# Patient Record
Sex: FEMALE | Race: WHITE | NOT HISPANIC OR LATINO | Employment: FULL TIME | URBAN - METROPOLITAN AREA
[De-identification: names, ages, dates, MRNs, and addresses within clinical notes are randomized per-mention and may not be internally consistent; named-entity substitution may affect disease eponyms.]

---

## 2023-02-09 PROBLEM — I82.409 DVT (DEEP VENOUS THROMBOSIS) (MULTI): Status: ACTIVE | Noted: 2023-02-09

## 2023-02-09 PROBLEM — J30.9 ALLERGIC RHINITIS: Status: ACTIVE | Noted: 2023-02-09

## 2023-02-09 PROBLEM — E78.5 HYPERLIPIDEMIA: Status: ACTIVE | Noted: 2023-02-09

## 2023-02-09 PROBLEM — M25.552 LEFT HIP PAIN: Status: ACTIVE | Noted: 2023-02-09

## 2023-02-09 PROBLEM — L30.9 ECZEMA: Status: ACTIVE | Noted: 2023-02-09

## 2023-02-09 PROBLEM — M16.12 PRIMARY LOCALIZED OSTEOARTHRITIS OF LEFT HIP: Status: ACTIVE | Noted: 2023-02-09

## 2023-02-09 PROBLEM — L28.2 PRURITIC RASH: Status: ACTIVE | Noted: 2023-02-09

## 2023-02-09 PROBLEM — R30.0 DYSURIA: Status: ACTIVE | Noted: 2023-02-09

## 2023-02-09 PROBLEM — K29.50 CHRONIC GASTRITIS: Status: ACTIVE | Noted: 2023-02-09

## 2023-02-09 PROBLEM — M25.571 ANKLE PAIN, RIGHT: Status: ACTIVE | Noted: 2023-02-09

## 2023-02-09 PROBLEM — F32.A DEPRESSION: Status: ACTIVE | Noted: 2023-02-09

## 2023-02-09 PROBLEM — J30.1 SEASONAL ALLERGIC RHINITIS DUE TO POLLEN: Status: ACTIVE | Noted: 2023-02-09
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
## 2023-02-09 PROBLEM — R21 FACIAL RASH: Status: ACTIVE | Noted: 2023-02-09

## 2023-02-09 PROBLEM — J45.909 ASTHMA (HHS-HCC): Status: ACTIVE | Noted: 2023-02-09

## 2023-02-09 PROBLEM — I26.99 PULMONARY EMBOLISM (MULTI): Status: ACTIVE | Noted: 2023-02-09

## 2023-02-09 PROBLEM — M51.36 DEGENERATIVE DISC DISEASE, LUMBAR: Status: ACTIVE | Noted: 2023-02-09

## 2023-02-09 PROBLEM — K20.90 ESOPHAGITIS: Status: ACTIVE | Noted: 2023-02-09

## 2023-02-09 PROBLEM — D68.51 HETEROZYGOUS FACTOR V LEIDEN MUTATION (MULTI): Status: ACTIVE | Noted: 2023-02-09

## 2023-02-09 PROBLEM — R35.0 URINARY FREQUENCY: Status: ACTIVE | Noted: 2023-02-09

## 2023-02-09 PROBLEM — M51.369 DEGENERATIVE DISC DISEASE, LUMBAR: Status: ACTIVE | Noted: 2023-02-09

## 2023-02-09 PROBLEM — L40.9 PSORIASIS: Status: ACTIVE | Noted: 2023-02-09

## 2023-02-09 PROBLEM — K44.9 HIATAL HERNIA: Status: ACTIVE | Noted: 2023-02-09

## 2023-02-09 PROBLEM — R21 RASH: Status: ACTIVE | Noted: 2023-02-09

## 2023-02-09 PROBLEM — M54.10 RADICULAR LEG PAIN: Status: ACTIVE | Noted: 2023-02-09

## 2023-02-09 RX ORDER — TRIAMCINOLONE ACETONIDE 0.25 MG/G
CREAM TOPICAL
COMMUNITY
Start: 2022-02-10

## 2023-02-09 RX ORDER — OMEPRAZOLE 20 MG/1
1 TABLET, DELAYED RELEASE ORAL DAILY
COMMUNITY
Start: 2022-02-10

## 2023-02-09 RX ORDER — VENLAFAXINE HYDROCHLORIDE 150 MG/1
1 CAPSULE, EXTENDED RELEASE ORAL DAILY
COMMUNITY
Start: 2014-03-29 | End: 2023-04-17

## 2023-02-09 RX ORDER — OXYBUTYNIN CHLORIDE 15 MG/1
1 TABLET, EXTENDED RELEASE ORAL DAILY
COMMUNITY
Start: 2014-08-02 | End: 2023-03-27

## 2023-02-09 RX ORDER — FLUTICASONE PROPIONATE 50 MCG
2 SPRAY, SUSPENSION (ML) NASAL DAILY
COMMUNITY
Start: 2018-08-15 | End: 2023-06-15 | Stop reason: SDUPTHER

## 2023-02-09 RX ORDER — NYSTATIN 100000 U/G
CREAM TOPICAL
COMMUNITY
Start: 2022-02-10 | End: 2024-01-23 | Stop reason: SDUPTHER

## 2023-03-22 ENCOUNTER — APPOINTMENT (OUTPATIENT)
Dept: PRIMARY CARE | Facility: CLINIC | Age: 63
End: 2023-03-22
Payer: MEDICAID

## 2023-03-26 DIAGNOSIS — R35.0 URINARY FREQUENCY: Primary | ICD-10-CM

## 2023-03-27 RX ORDER — OXYBUTYNIN CHLORIDE 15 MG/1
TABLET, EXTENDED RELEASE ORAL
Qty: 90 TABLET | Refills: 0 | Status: SHIPPED | OUTPATIENT
Start: 2023-03-27 | End: 2023-06-26

## 2023-04-17 DIAGNOSIS — F32.A DEPRESSION, UNSPECIFIED DEPRESSION TYPE: Primary | ICD-10-CM

## 2023-04-17 RX ORDER — VENLAFAXINE HYDROCHLORIDE 150 MG/1
CAPSULE, EXTENDED RELEASE ORAL
Qty: 90 CAPSULE | Refills: 0 | Status: SHIPPED | OUTPATIENT
Start: 2023-04-17 | End: 2023-07-24

## 2023-06-15 ENCOUNTER — OFFICE VISIT (OUTPATIENT)
Dept: PRIMARY CARE | Facility: CLINIC | Age: 63
End: 2023-06-15
Payer: MEDICAID

## 2023-06-15 VITALS
OXYGEN SATURATION: 95 % | WEIGHT: 195.2 LBS | SYSTOLIC BLOOD PRESSURE: 112 MMHG | DIASTOLIC BLOOD PRESSURE: 60 MMHG | HEART RATE: 73 BPM | BODY MASS INDEX: 33.32 KG/M2 | RESPIRATION RATE: 18 BRPM | HEIGHT: 64 IN

## 2023-06-15 DIAGNOSIS — Z12.11 COLON CANCER SCREENING: ICD-10-CM

## 2023-06-15 DIAGNOSIS — F32.A DEPRESSION, UNSPECIFIED DEPRESSION TYPE: ICD-10-CM

## 2023-06-15 DIAGNOSIS — D68.51 HETEROZYGOUS FACTOR V LEIDEN MUTATION (MULTI): ICD-10-CM

## 2023-06-15 DIAGNOSIS — J30.9 ALLERGIC RHINITIS, UNSPECIFIED SEASONALITY, UNSPECIFIED TRIGGER: ICD-10-CM

## 2023-06-15 DIAGNOSIS — Z00.00 ANNUAL PHYSICAL EXAM: Primary | ICD-10-CM

## 2023-06-15 DIAGNOSIS — I82.5Y9 CHRONIC DEEP VEIN THROMBOSIS (DVT) OF PROXIMAL VEIN OF LOWER EXTREMITY, UNSPECIFIED LATERALITY (MULTI): ICD-10-CM

## 2023-06-15 DIAGNOSIS — J45.909 ASTHMA, UNSPECIFIED ASTHMA SEVERITY, UNSPECIFIED WHETHER COMPLICATED, UNSPECIFIED WHETHER PERSISTENT (HHS-HCC): ICD-10-CM

## 2023-06-15 DIAGNOSIS — Z23 IMMUNIZATION DUE: ICD-10-CM

## 2023-06-15 DIAGNOSIS — Z12.39 BREAST SCREENING: ICD-10-CM

## 2023-06-15 PROBLEM — R35.0 URINARY FREQUENCY: Status: RESOLVED | Noted: 2023-02-09 | Resolved: 2023-06-15

## 2023-06-15 PROCEDURE — 90471 IMMUNIZATION ADMIN: CPT | Performed by: STUDENT IN AN ORGANIZED HEALTH CARE EDUCATION/TRAINING PROGRAM

## 2023-06-15 PROCEDURE — 1036F TOBACCO NON-USER: CPT | Performed by: STUDENT IN AN ORGANIZED HEALTH CARE EDUCATION/TRAINING PROGRAM

## 2023-06-15 PROCEDURE — 99214 OFFICE O/P EST MOD 30 MIN: CPT | Performed by: STUDENT IN AN ORGANIZED HEALTH CARE EDUCATION/TRAINING PROGRAM

## 2023-06-15 PROCEDURE — 90715 TDAP VACCINE 7 YRS/> IM: CPT | Performed by: STUDENT IN AN ORGANIZED HEALTH CARE EDUCATION/TRAINING PROGRAM

## 2023-06-15 RX ORDER — FLUTICASONE PROPIONATE 50 MCG
2 SPRAY, SUSPENSION (ML) NASAL DAILY
Qty: 16 G | Refills: 6 | Status: SHIPPED | OUTPATIENT
Start: 2023-06-15

## 2023-06-15 ASSESSMENT — PATIENT HEALTH QUESTIONNAIRE - PHQ9
SUM OF ALL RESPONSES TO PHQ9 QUESTIONS 1 AND 2: 0
1. LITTLE INTEREST OR PLEASURE IN DOING THINGS: NOT AT ALL
2. FEELING DOWN, DEPRESSED OR HOPELESS: NOT AT ALL

## 2023-06-15 NOTE — PROGRESS NOTES
Subjective   Patient ID: Celia Shahdi is a 62 y.o. female who presents for Annual Exam (Pt is here for CPE.).  Mother did move in and has been having some difficulties with her falling  She is currently getting a bit better but has been hard    PMHx of DVT/PE post surgery , DVT- 04- ocps, Depression, Asthma (controlled), Psoriasis, DSL who presents for depression follow up.        Objective   Physical Exam  Constitutional:       Appearance: Normal appearance.   Cardiovascular:      Rate and Rhythm: Normal rate and regular rhythm.      Heart sounds: No murmur heard.  Pulmonary:      Effort: Pulmonary effort is normal. No respiratory distress.      Breath sounds: Normal breath sounds. No wheezing.   Musculoskeletal:      Cervical back: Neck supple.      Left lower leg: No edema.   Neurological:      Mental Status: She is alert.       A/P        Annual CPE   Doing well physical exam benign   UTD on vaccines   UTD on vision and dental visits     Anxiety  controlled   refilled venlafaxine to 262.5mg daily      DVT/PE  indefinite anticoagulation  eliquis 2.5mg BID   *pt second provoked DVT with family history of DVT     Seasonal allergies   *Flonase refilled      Health Maintenance   mammogram UTD 2/22  influenza vaccine   Pap due 2006 negative/negative      6 month follow up for HTN

## 2023-06-22 ENCOUNTER — LAB (OUTPATIENT)
Dept: LAB | Facility: LAB | Age: 63
End: 2023-06-22
Payer: MEDICAID

## 2023-06-22 DIAGNOSIS — J30.9 ALLERGIC RHINITIS, UNSPECIFIED SEASONALITY, UNSPECIFIED TRIGGER: ICD-10-CM

## 2023-06-22 DIAGNOSIS — F32.A DEPRESSION, UNSPECIFIED DEPRESSION TYPE: ICD-10-CM

## 2023-06-22 LAB
ALANINE AMINOTRANSFERASE (SGPT) (U/L) IN SER/PLAS: 14 U/L (ref 7–45)
ALBUMIN (G/DL) IN SER/PLAS: 4.1 G/DL (ref 3.4–5)
ALKALINE PHOSPHATASE (U/L) IN SER/PLAS: 85 U/L (ref 33–136)
ANION GAP IN SER/PLAS: 12 MMOL/L (ref 10–20)
ASPARTATE AMINOTRANSFERASE (SGOT) (U/L) IN SER/PLAS: 16 U/L (ref 9–39)
BASOPHILS (10*3/UL) IN BLOOD BY AUTOMATED COUNT: 0.08 X10E9/L (ref 0–0.1)
BASOPHILS/100 LEUKOCYTES IN BLOOD BY AUTOMATED COUNT: 1.3 % (ref 0–2)
BILIRUBIN TOTAL (MG/DL) IN SER/PLAS: 0.4 MG/DL (ref 0–1.2)
CALCIUM (MG/DL) IN SER/PLAS: 8.5 MG/DL (ref 8.6–10.3)
CARBON DIOXIDE, TOTAL (MMOL/L) IN SER/PLAS: 26 MMOL/L (ref 21–32)
CHLORIDE (MMOL/L) IN SER/PLAS: 105 MMOL/L (ref 98–107)
CHOLESTEROL (MG/DL) IN SER/PLAS: 193 MG/DL (ref 0–199)
CHOLESTEROL IN HDL (MG/DL) IN SER/PLAS: 53.1 MG/DL
CHOLESTEROL/HDL RATIO: 3.6
CREATININE (MG/DL) IN SER/PLAS: 0.75 MG/DL (ref 0.5–1.05)
EOSINOPHILS (10*3/UL) IN BLOOD BY AUTOMATED COUNT: 0.37 X10E9/L (ref 0–0.7)
EOSINOPHILS/100 LEUKOCYTES IN BLOOD BY AUTOMATED COUNT: 6.2 % (ref 0–6)
ERYTHROCYTE DISTRIBUTION WIDTH (RATIO) BY AUTOMATED COUNT: 13.5 % (ref 11.5–14.5)
ERYTHROCYTE MEAN CORPUSCULAR HEMOGLOBIN CONCENTRATION (G/DL) BY AUTOMATED: 32.2 G/DL (ref 32–36)
ERYTHROCYTE MEAN CORPUSCULAR VOLUME (FL) BY AUTOMATED COUNT: 88 FL (ref 80–100)
ERYTHROCYTES (10*6/UL) IN BLOOD BY AUTOMATED COUNT: 4.21 X10E12/L (ref 4–5.2)
GFR FEMALE: 90 ML/MIN/1.73M2
GLUCOSE (MG/DL) IN SER/PLAS: 77 MG/DL (ref 74–99)
HEMATOCRIT (%) IN BLOOD BY AUTOMATED COUNT: 36.9 % (ref 36–46)
HEMOGLOBIN (G/DL) IN BLOOD: 11.9 G/DL (ref 12–16)
IMMATURE GRANULOCYTES/100 LEUKOCYTES IN BLOOD BY AUTOMATED COUNT: 0.3 % (ref 0–0.9)
LDL: 117 MG/DL (ref 0–99)
LEUKOCYTES (10*3/UL) IN BLOOD BY AUTOMATED COUNT: 6 X10E9/L (ref 4.4–11.3)
LYMPHOCYTES (10*3/UL) IN BLOOD BY AUTOMATED COUNT: 1.74 X10E9/L (ref 1.2–4.8)
LYMPHOCYTES/100 LEUKOCYTES IN BLOOD BY AUTOMATED COUNT: 29.1 % (ref 13–44)
MONOCYTES (10*3/UL) IN BLOOD BY AUTOMATED COUNT: 0.51 X10E9/L (ref 0.1–1)
MONOCYTES/100 LEUKOCYTES IN BLOOD BY AUTOMATED COUNT: 8.5 % (ref 2–10)
NEUTROPHILS (10*3/UL) IN BLOOD BY AUTOMATED COUNT: 3.25 X10E9/L (ref 1.2–7.7)
NEUTROPHILS/100 LEUKOCYTES IN BLOOD BY AUTOMATED COUNT: 54.6 % (ref 40–80)
PLATELETS (10*3/UL) IN BLOOD AUTOMATED COUNT: 254 X10E9/L (ref 150–450)
POTASSIUM (MMOL/L) IN SER/PLAS: 4.4 MMOL/L (ref 3.5–5.3)
PROTEIN TOTAL: 6.5 G/DL (ref 6.4–8.2)
SODIUM (MMOL/L) IN SER/PLAS: 139 MMOL/L (ref 136–145)
THYROTROPIN (MIU/L) IN SER/PLAS BY DETECTION LIMIT <= 0.05 MIU/L: 1.9 MIU/L (ref 0.44–3.98)
TRIGLYCERIDE (MG/DL) IN SER/PLAS: 116 MG/DL (ref 0–149)
UREA NITROGEN (MG/DL) IN SER/PLAS: 16 MG/DL (ref 6–23)
VLDL: 23 MG/DL (ref 0–40)

## 2023-06-22 PROCEDURE — 84443 ASSAY THYROID STIM HORMONE: CPT

## 2023-06-22 PROCEDURE — 36415 COLL VENOUS BLD VENIPUNCTURE: CPT

## 2023-06-22 PROCEDURE — 80053 COMPREHEN METABOLIC PANEL: CPT

## 2023-06-22 PROCEDURE — 85025 COMPLETE CBC W/AUTO DIFF WBC: CPT

## 2023-06-22 PROCEDURE — 80061 LIPID PANEL: CPT

## 2023-06-25 DIAGNOSIS — R35.0 URINARY FREQUENCY: ICD-10-CM

## 2023-06-26 RX ORDER — OXYBUTYNIN CHLORIDE 15 MG/1
TABLET, EXTENDED RELEASE ORAL
Qty: 90 TABLET | Refills: 0 | Status: SHIPPED | OUTPATIENT
Start: 2023-06-26 | End: 2023-09-25

## 2023-07-23 DIAGNOSIS — F32.A DEPRESSION, UNSPECIFIED DEPRESSION TYPE: ICD-10-CM

## 2023-07-24 RX ORDER — VENLAFAXINE HYDROCHLORIDE 150 MG/1
CAPSULE, EXTENDED RELEASE ORAL
Qty: 90 CAPSULE | Refills: 1 | Status: SHIPPED | OUTPATIENT
Start: 2023-07-24 | End: 2024-01-23 | Stop reason: SDUPTHER

## 2023-09-24 DIAGNOSIS — R35.0 URINARY FREQUENCY: ICD-10-CM

## 2023-09-25 RX ORDER — OXYBUTYNIN CHLORIDE 15 MG/1
TABLET, EXTENDED RELEASE ORAL
Qty: 90 TABLET | Refills: 1 | Status: SHIPPED | OUTPATIENT
Start: 2023-09-25 | End: 2024-03-23

## 2023-10-06 ENCOUNTER — LAB REQUISITION (OUTPATIENT)
Dept: LAB | Facility: HOSPITAL | Age: 63
End: 2023-10-06
Payer: MEDICAID

## 2023-10-06 DIAGNOSIS — R31.9 HEMATURIA, UNSPECIFIED: ICD-10-CM

## 2023-10-06 PROCEDURE — 87624 HPV HI-RISK TYP POOLED RSLT: CPT

## 2023-10-06 PROCEDURE — 88141 CYTOPATH C/V INTERPRET: CPT | Performed by: PATHOLOGY

## 2023-10-06 PROCEDURE — 87086 URINE CULTURE/COLONY COUNT: CPT

## 2023-10-06 PROCEDURE — 88175 CYTOPATH C/V AUTO FLUID REDO: CPT

## 2023-10-08 LAB — BACTERIA UR CULT: ABNORMAL

## 2023-10-10 ENCOUNTER — LAB REQUISITION (OUTPATIENT)
Dept: LAB | Facility: HOSPITAL | Age: 63
End: 2023-10-10
Payer: MEDICAID

## 2023-10-10 DIAGNOSIS — Z11.51 ENCOUNTER FOR SCREENING FOR HUMAN PAPILLOMAVIRUS (HPV): ICD-10-CM

## 2023-10-10 DIAGNOSIS — Z12.4 ENCOUNTER FOR SCREENING FOR MALIGNANT NEOPLASM OF CERVIX: ICD-10-CM

## 2023-10-10 DIAGNOSIS — Z01.419 ENCOUNTER FOR GYNECOLOGICAL EXAMINATION (GENERAL) (ROUTINE) WITHOUT ABNORMAL FINDINGS: ICD-10-CM

## 2023-10-30 ENCOUNTER — TELEPHONE (OUTPATIENT)
Dept: HEMATOLOGY/ONCOLOGY | Facility: CLINIC | Age: 63
End: 2023-10-30
Payer: MEDICAID

## 2023-10-30 DIAGNOSIS — D68.51 HETEROZYGOUS FACTOR V LEIDEN MUTATION (MULTI): Primary | ICD-10-CM

## 2023-10-30 DIAGNOSIS — I26.99 PULMONARY EMBOLISM, UNSPECIFIED CHRONICITY, UNSPECIFIED PULMONARY EMBOLISM TYPE, UNSPECIFIED WHETHER ACUTE COR PULMONALE PRESENT (MULTI): ICD-10-CM

## 2023-10-31 LAB
CYTOLOGY CMNT CVX/VAG CYTO-IMP: NORMAL
HPV HR GENOTYPES PNL CVX NAA+PROBE: NEGATIVE
HPV HR GENOTYPES PNL CVX NAA+PROBE: NEGATIVE
HPV16 DNA SPEC QL NAA+PROBE: NEGATIVE
HPV18 DNA SPEC QL NAA+PROBE: NEGATIVE
LAB AP HPV GENOTYPE QUESTION: YES
LAB AP HPV HR: NORMAL
LABORATORY COMMENT REPORT: NORMAL
MENSTRUAL HX REPORTED: NORMAL
PATH REPORT.TOTAL CANCER: NORMAL

## 2023-11-02 ENCOUNTER — CLINICAL SUPPORT (OUTPATIENT)
Dept: PREADMISSION TESTING | Facility: HOSPITAL | Age: 63
End: 2023-11-02
Payer: MEDICAID

## 2023-11-02 ASSESSMENT — DUKE ACTIVITY SCORE INDEX (DASI)
CAN YOU WALK A BLOCK OR TWO ON LEVEL GROUND: YES
CAN YOU DO HEAVY WORK AROUND THE HOUSE LIKE SCRUBBING FLOORS OR LIFTING AND MOVING HEAVY FURNITURE: YES
CAN YOU RUN A SHORT DISTANCE: YES
CAN YOU CLIMB A FLIGHT OF STAIRS OR WALK UP A HILL: YES
CAN YOU DO MODERATE WORK AROUND THE HOUSE LIKE VACUUMING, SWEEPING FLOORS OR CARRYING GROCERIES: YES
CAN YOU WALK INDOORS, SUCH AS AROUND YOUR HOUSE: YES
CAN YOU TAKE CARE OF YOURSELF (EAT, DRESS, BATHE, OR USE TOILET): YES
CAN YOU HAVE SEXUAL RELATIONS: YES
DASI METS SCORE: 8.2
CAN YOU DO LIGHT WORK AROUND THE HOUSE LIKE DUSTING OR WASHING DISHES: YES
TOTAL_SCORE: 44.7
CAN YOU PARTICIPATE IN MODERATE RECREATIONAL ACTIVITIES LIKE GOLF, BOWLING, DANCING, DOUBLES TENNIS OR THROWING A BASEBALL OR FOOTBALL: NO
CAN YOU PARTICIPATE IN STRENOUS SPORTS LIKE SWIMMING, SINGLES TENNIS, FOOTBALL, BASKETBALL, OR SKIING: NO
CAN YOU DO YARD WORK LIKE RAKING LEAVES, WEEDING OR PUSHING A MOWER: YES

## 2023-11-02 NOTE — PREPROCEDURE INSTRUCTIONS
Medication List            Accurate as of November 2, 2023  9:45 AM. Always use your most recent med list.                apixaban 5 mg tablet  Commonly known as: Eliquis  Eliquis 5 MG Oral Tablet 1 BID   Quantity: 60  Refills: 0        Start : 16-Mar-2021   Active  Medication Adjustments for Surgery: Stop 2 days before surgery     fluticasone 50 mcg/actuation nasal spray  Commonly known as: Flonase  Administer 2 sprays into each nostril once daily.  Medication Adjustments for Surgery: Take morning of surgery with sip of water, no other fluids     nystatin cream  Commonly known as: Mycostatin  Medication Adjustments for Surgery: Other (Comment)     omeprazole OTC 20 mg EC tablet  Commonly known as: PriLOSEC OTC  Medication Adjustments for Surgery: Take morning of surgery with sip of water, no other fluids     oxybutynin XL 15 mg 24 hr tablet  Commonly known as: Ditropan-XL  Take 1 tablet by mouth once daily  Medication Adjustments for Surgery: Continue until night before surgery     triamcinolone 0.025 % cream  Commonly known as: Kenalog  Medication Adjustments for Surgery: Continue until night before surgery     venlafaxine  mg 24 hr capsule  Commonly known as: Effexor-XR  Take 1 capsule by mouth once daily  Medication Adjustments for Surgery: Take morning of surgery with sip of water, no other fluids                              NPO Instructions:    Follow instructions. Day before procedure-may have light breakfast by 9am (ex. 1 egg, 1 piece of toast).  Then CLEAR LIQUIDS ONLY-No dairy products, nothing red/purple.  Take first part of prep- Evening Before Procedure.  Drink lots of liquids.  Day of Procedure- 3-4am Take Second Part of Prep.   STOP DRINKING 3 HOURS PRIOR TO PROCEDURE.  Take medications as instructed.      Additional Instructions:     Review your medication instructions, take indicated medications  Wear  comfortable loose fitting clothing  All jewelry and valuables should be left at  home    Park in back of hospital by ER. Come up to Second floor-Outpt dept to check in.  Bring Photo ID and Insurance card,   You MUST have a  with you.      Call Outpatient dept at [phone] the night before your procedure (Friday for Monday procedure), between 1-3 pm.   If you get ill at all before your procedure- CALL YOUR DOCTOR/SURGEON.  We want you in the best shape that is possible. Any sickness might lead to your procedure being delayed.

## 2023-11-05 ENCOUNTER — PREP FOR PROCEDURE (OUTPATIENT)
Dept: SURGERY | Facility: HOSPITAL | Age: 63
End: 2023-11-05
Payer: MEDICAID

## 2023-11-05 RX ORDER — SODIUM CHLORIDE, SODIUM LACTATE, POTASSIUM CHLORIDE, CALCIUM CHLORIDE 600; 310; 30; 20 MG/100ML; MG/100ML; MG/100ML; MG/100ML
100 INJECTION, SOLUTION INTRAVENOUS CONTINUOUS
OUTPATIENT
Start: 2023-11-05

## 2023-11-05 RX ORDER — ONDANSETRON HYDROCHLORIDE 2 MG/ML
4 INJECTION, SOLUTION INTRAVENOUS ONCE AS NEEDED
OUTPATIENT
Start: 2023-11-05

## 2023-12-12 ENCOUNTER — APPOINTMENT (OUTPATIENT)
Dept: PRIMARY CARE | Facility: CLINIC | Age: 63
End: 2023-12-12
Payer: MEDICAID

## 2023-12-13 ENCOUNTER — TELEPHONE (OUTPATIENT)
Dept: HEMATOLOGY/ONCOLOGY | Facility: CLINIC | Age: 63
End: 2023-12-13
Payer: MEDICAID

## 2023-12-13 DIAGNOSIS — I26.99 PULMONARY EMBOLISM, UNSPECIFIED CHRONICITY, UNSPECIFIED PULMONARY EMBOLISM TYPE, UNSPECIFIED WHETHER ACUTE COR PULMONALE PRESENT (MULTI): ICD-10-CM

## 2023-12-13 DIAGNOSIS — I26.99 PULMONARY EMBOLISM, UNSPECIFIED CHRONICITY, UNSPECIFIED PULMONARY EMBOLISM TYPE, UNSPECIFIED WHETHER ACUTE COR PULMONALE PRESENT (MULTI): Primary | ICD-10-CM

## 2024-01-04 ENCOUNTER — APPOINTMENT (OUTPATIENT)
Dept: HEMATOLOGY/ONCOLOGY | Facility: CLINIC | Age: 64
End: 2024-01-04
Payer: MEDICAID

## 2024-01-09 ENCOUNTER — APPOINTMENT (OUTPATIENT)
Dept: PRIMARY CARE | Facility: CLINIC | Age: 64
End: 2024-01-09
Payer: MEDICAID

## 2024-01-11 ENCOUNTER — OFFICE VISIT (OUTPATIENT)
Dept: HEMATOLOGY/ONCOLOGY | Facility: CLINIC | Age: 64
End: 2024-01-11
Payer: MEDICAID

## 2024-01-11 VITALS
RESPIRATION RATE: 18 BRPM | WEIGHT: 202.6 LBS | HEART RATE: 60 BPM | BODY MASS INDEX: 34.78 KG/M2 | OXYGEN SATURATION: 100 % | DIASTOLIC BLOOD PRESSURE: 83 MMHG | TEMPERATURE: 96.6 F | SYSTOLIC BLOOD PRESSURE: 153 MMHG

## 2024-01-11 DIAGNOSIS — Z79.01 CURRENT USE OF LONG TERM ANTICOAGULATION: Primary | ICD-10-CM

## 2024-01-11 DIAGNOSIS — I26.99 PULMONARY EMBOLISM, UNSPECIFIED CHRONICITY, UNSPECIFIED PULMONARY EMBOLISM TYPE, UNSPECIFIED WHETHER ACUTE COR PULMONALE PRESENT (MULTI): ICD-10-CM

## 2024-01-11 PROCEDURE — 99213 OFFICE O/P EST LOW 20 MIN: CPT | Performed by: NURSE PRACTITIONER

## 2024-01-11 PROCEDURE — 1036F TOBACCO NON-USER: CPT | Performed by: NURSE PRACTITIONER

## 2024-01-11 ASSESSMENT — ENCOUNTER SYMPTOMS
OCCASIONAL FEELINGS OF UNSTEADINESS: 0
LOSS OF SENSATION IN FEET: 0
DEPRESSION: 0

## 2024-01-11 ASSESSMENT — PATIENT HEALTH QUESTIONNAIRE - PHQ9
1. LITTLE INTEREST OR PLEASURE IN DOING THINGS: NOT AT ALL
2. FEELING DOWN, DEPRESSED OR HOPELESS: NOT AT ALL
SUM OF ALL RESPONSES TO PHQ9 QUESTIONS 1 AND 2: 0

## 2024-01-11 ASSESSMENT — COLUMBIA-SUICIDE SEVERITY RATING SCALE - C-SSRS
6. HAVE YOU EVER DONE ANYTHING, STARTED TO DO ANYTHING, OR PREPARED TO DO ANYTHING TO END YOUR LIFE?: NO
2. HAVE YOU ACTUALLY HAD ANY THOUGHTS OF KILLING YOURSELF?: NO

## 2024-01-11 NOTE — PROGRESS NOTES
Patient ID: Celia Shahid is a 63 y.o. female.    History of Present Illness:  63-year-old woman who has a history of DVT and PE, initially had a DVT and PE in her late 40s.  At that time, she was on birth control pills. She was treated with Coumadin for approximately one year's time. She then presented postoperativly with shortness of beath in September of 2019 and was found to have right lung PE and left lower extremity  DVT. She was treated with heparin and sent home on Eliquis, which she has continued to date, Eliquis 5mg BID.  HETEROZYGOUS for Factor V Leiden, prothrombin gene mutation negative. Plan is for life long anticoagulation.     Interval History:  Patient returns today for routine follow-up evaluation for recurrent thrombosis and factor V Leiden heterozygosity.  She remains on Eliquis and is taking it as directed, planned lifelong therapy. On presentation today she denies any fevers, chills or night sweats. No cough, chest pain or shortness of breath. No nausea or vomiting. No constipation or diarrhea. No signs or symptoms of active bleeding or unusual bruising.  No surgical interventions over the past year and no upcoming surgeries planned.  Patient is primary caregiver for her mother who is now moved in with her and is exhibiting signs of dementia.    Review of Systems:  A review of systems has been completed and are negative for complaints except what is stated in the HPI and/or past medical history      Past Medical History:  Depression, DVT and PE on life long anticoagulation    Past Surgical History:  retrocalcaneal exostectomy of the left foot (2019)  FAMILY HISTORY: Mother had blood clots.     Allergies:    Oxaprozin    Medications:  Medication list reviewed with patient and updated in EMR    Social History:   She lives with her  and extended family.  Primary caregiver for her mother.  She works at [employer]  She does not smoke nor drink.       Vital Signs:   /83 (BP Location:  Right arm, Patient Position: Sitting, BP Cuff Size: Large adult long)   Pulse 60   Temp 35.9 °C (96.6 °F) (Temporal)   Resp 18   Wt 91.9 kg (202 lb 9.6 oz)   SpO2 100%   BMI 34.78 kg/m²     Physical Exam:  ECO  Pain: 0  Constitutional: Well developed, awake/alert/oriented x3, no distress, alert and cooperative  Eyes: PER. sclera anicteric  ENMT: Oral mucosa moist  Respiratory/Thorax: Breathing is non-labored. Lungs are clear to auscultation bilaterally. No adventitious breath sounds  Cardiovascular: S1-S2. Regular rate and rhythm. No murmurs, rubs, or gallops appreciated  Gastrointestinal: Abdomen soft nontender, nondistended, normal active bowel sounds.   Musculoskeletal: ROM intact, no joint swelling, normal strength  Extremities: normal extremities, no cyanosis, no edema, no clubbing  Neurologic: alert and oriented x3. Nonfocal exam. No myoclonus  Psychological: Pleasant, appropriate and easily engaged     Lab Results:  2023  WBC 6.0, hemoglobin 1.9, hematocrit 36.9, platelets 245,000  CMP no concerning findings    Radiology Results:  2019 CT angio chest     IMPRESSION:  1. Right central pulmonary artery embolus extending into the right middle   lobe pulmonary artery measures showering emboli in the medial and lateral   segmental arteries.  2. Atelectasis in the right and left lung bases and right middle lobe.  3. Mild patchy attenuation of the lung parenchyma which be seen with   component pulmonary edema.   ___________________________________________________     2019  RESULT: DPL VENOUS LEG LT: 2019 5:06 PM  CLINICAL HISTORY: Shortness of breath and left leg cast.      FINDINGS:  Left Lower Extremity: Within the left posterior tibial veins is acute   hypoechoic thrombus with lack of compressibility and no color flow. The   left common femoral vein, femoral vein, popliteal vein and peroneal veins   are patent.     Contralateral common femoral vein was patent.      IMPRESSION:     1. Acute deep venous thrombosis in left calf veins. No acute deep venous   thrombosis above the knee.         Assessment:  This is a pleasant 63-year-old female with recurrent DVT and PE x2. She also carries a history of factor V Leiden. Current dose of Eliquis is 5 mg twice daily. We  will continue this. The patient is on lifelong anticoagulation at this time. She is up to date with regular health maintenance including colonoscopy and mammograms.   A refill was placed for her Eliquis today    Plan:  - 1 year follow-up  - confirm CBCd and CMP are completed annually          Curt Calvillo, APRN-CNP

## 2024-01-23 ENCOUNTER — LAB (OUTPATIENT)
Dept: LAB | Facility: LAB | Age: 64
End: 2024-01-23
Payer: MEDICAID

## 2024-01-23 ENCOUNTER — OFFICE VISIT (OUTPATIENT)
Dept: PRIMARY CARE | Facility: CLINIC | Age: 64
End: 2024-01-23
Payer: MEDICAID

## 2024-01-23 VITALS
BODY MASS INDEX: 34.08 KG/M2 | RESPIRATION RATE: 17 BRPM | HEIGHT: 64 IN | WEIGHT: 199.6 LBS | DIASTOLIC BLOOD PRESSURE: 70 MMHG | SYSTOLIC BLOOD PRESSURE: 116 MMHG | OXYGEN SATURATION: 99 % | HEART RATE: 78 BPM

## 2024-01-23 DIAGNOSIS — R21 RASH: ICD-10-CM

## 2024-01-23 DIAGNOSIS — F32.A DEPRESSION, UNSPECIFIED DEPRESSION TYPE: ICD-10-CM

## 2024-01-23 DIAGNOSIS — Z13.9 SCREENING DUE: Primary | ICD-10-CM

## 2024-01-23 DIAGNOSIS — E66.9 OBESITY (BMI 30.0-34.9): ICD-10-CM

## 2024-01-23 LAB
ALBUMIN SERPL BCP-MCNC: 4.1 G/DL (ref 3.4–5)
ALP SERPL-CCNC: 126 U/L (ref 33–136)
ALT SERPL W P-5'-P-CCNC: 17 U/L (ref 7–45)
ANION GAP SERPL CALC-SCNC: 12 MMOL/L (ref 10–20)
AST SERPL W P-5'-P-CCNC: 20 U/L (ref 9–39)
BASOPHILS # BLD AUTO: 0.07 X10*3/UL (ref 0–0.1)
BASOPHILS NFR BLD AUTO: 1.2 %
BILIRUB SERPL-MCNC: 0.5 MG/DL (ref 0–1.2)
BUN SERPL-MCNC: 11 MG/DL (ref 6–23)
CALCIUM SERPL-MCNC: 9.1 MG/DL (ref 8.6–10.3)
CHLORIDE SERPL-SCNC: 104 MMOL/L (ref 98–107)
CHOLEST SERPL-MCNC: 204 MG/DL (ref 0–199)
CHOLESTEROL/HDL RATIO: 4.1
CO2 SERPL-SCNC: 29 MMOL/L (ref 21–32)
CREAT SERPL-MCNC: 0.73 MG/DL (ref 0.5–1.05)
EGFRCR SERPLBLD CKD-EPI 2021: >90 ML/MIN/1.73M*2
EOSINOPHIL # BLD AUTO: 0.47 X10*3/UL (ref 0–0.7)
EOSINOPHIL NFR BLD AUTO: 8 %
ERYTHROCYTE [DISTWIDTH] IN BLOOD BY AUTOMATED COUNT: 13.2 % (ref 11.5–14.5)
GLUCOSE SERPL-MCNC: 91 MG/DL (ref 74–99)
HCT VFR BLD AUTO: 40.1 % (ref 36–46)
HDLC SERPL-MCNC: 50.2 MG/DL
HGB BLD-MCNC: 12.7 G/DL (ref 12–16)
IMM GRANULOCYTES # BLD AUTO: 0.01 X10*3/UL (ref 0–0.7)
IMM GRANULOCYTES NFR BLD AUTO: 0.2 % (ref 0–0.9)
LYMPHOCYTES # BLD AUTO: 1.65 X10*3/UL (ref 1.2–4.8)
LYMPHOCYTES NFR BLD AUTO: 28 %
MCH RBC QN AUTO: 28.2 PG (ref 26–34)
MCHC RBC AUTO-ENTMCNC: 31.7 G/DL (ref 32–36)
MCV RBC AUTO: 89 FL (ref 80–100)
MONOCYTES # BLD AUTO: 0.49 X10*3/UL (ref 0.1–1)
MONOCYTES NFR BLD AUTO: 8.3 %
NEUTROPHILS # BLD AUTO: 3.2 X10*3/UL (ref 1.2–7.7)
NEUTROPHILS NFR BLD AUTO: 54.3 %
NON-HDL CHOLESTEROL: 154 MG/DL (ref 0–149)
NRBC BLD-RTO: 0 /100 WBCS (ref 0–0)
PLATELET # BLD AUTO: 275 X10*3/UL (ref 150–450)
POTASSIUM SERPL-SCNC: 4.3 MMOL/L (ref 3.5–5.3)
PROT SERPL-MCNC: 6.4 G/DL (ref 6.4–8.2)
RBC # BLD AUTO: 4.5 X10*6/UL (ref 4–5.2)
SODIUM SERPL-SCNC: 141 MMOL/L (ref 136–145)
TSH SERPL-ACNC: 1.82 MIU/L (ref 0.44–3.98)
WBC # BLD AUTO: 5.9 X10*3/UL (ref 4.4–11.3)

## 2024-01-23 PROCEDURE — 83036 HEMOGLOBIN GLYCOSYLATED A1C: CPT

## 2024-01-23 PROCEDURE — 82465 ASSAY BLD/SERUM CHOLESTEROL: CPT

## 2024-01-23 PROCEDURE — 80053 COMPREHEN METABOLIC PANEL: CPT

## 2024-01-23 PROCEDURE — 36415 COLL VENOUS BLD VENIPUNCTURE: CPT

## 2024-01-23 PROCEDURE — 1036F TOBACCO NON-USER: CPT | Performed by: STUDENT IN AN ORGANIZED HEALTH CARE EDUCATION/TRAINING PROGRAM

## 2024-01-23 PROCEDURE — 83718 ASSAY OF LIPOPROTEIN: CPT

## 2024-01-23 PROCEDURE — 84443 ASSAY THYROID STIM HORMONE: CPT

## 2024-01-23 PROCEDURE — 85025 COMPLETE CBC W/AUTO DIFF WBC: CPT

## 2024-01-23 PROCEDURE — 99214 OFFICE O/P EST MOD 30 MIN: CPT | Performed by: STUDENT IN AN ORGANIZED HEALTH CARE EDUCATION/TRAINING PROGRAM

## 2024-01-23 RX ORDER — VENLAFAXINE HYDROCHLORIDE 150 MG/1
150 CAPSULE, EXTENDED RELEASE ORAL DAILY
Qty: 90 CAPSULE | Refills: 1 | Status: SHIPPED | OUTPATIENT
Start: 2024-01-23

## 2024-01-23 RX ORDER — NYSTATIN 100000 U/G
CREAM TOPICAL AS NEEDED
Qty: 30 G | Refills: 1 | Status: SHIPPED | OUTPATIENT
Start: 2024-01-23 | End: 2024-03-23

## 2024-01-23 ASSESSMENT — ENCOUNTER SYMPTOMS
OCCASIONAL FEELINGS OF UNSTEADINESS: 0
LOSS OF SENSATION IN FEET: 1
DEPRESSION: 1

## 2024-01-23 ASSESSMENT — PATIENT HEALTH QUESTIONNAIRE - PHQ9
1. LITTLE INTEREST OR PLEASURE IN DOING THINGS: NOT AT ALL
SUM OF ALL RESPONSES TO PHQ9 QUESTIONS 1 AND 2: 2
10. IF YOU CHECKED OFF ANY PROBLEMS, HOW DIFFICULT HAVE THESE PROBLEMS MADE IT FOR YOU TO DO YOUR WORK, TAKE CARE OF THINGS AT HOME, OR GET ALONG WITH OTHER PEOPLE: NOT DIFFICULT AT ALL
2. FEELING DOWN, DEPRESSED OR HOPELESS: MORE THAN HALF THE DAYS

## 2024-01-23 NOTE — PROGRESS NOTES
"Subjective   Patient ID: Celia Shahid is a 63 y.o. female who presents for Follow-up (Pt is here for a 6 month follow up.).    Anxiety   Has been a bit more temperamental with her mother who now lives with her  She is having a hard time \"getting it together\"  Patient is starting to feel very overwhelmed as the primary care has fallen to her she has very little to no respite between her siblings when in regards to her mother and this is causing frustration in the home including her .          Objective   Physical Exam  Vitals reviewed.   Constitutional:       Appearance: Normal appearance.   Cardiovascular:      Rate and Rhythm: Normal rate and regular rhythm.      Heart sounds: No murmur heard.  Pulmonary:      Effort: Pulmonary effort is normal. No respiratory distress.      Breath sounds: Normal breath sounds. No wheezing.   Musculoskeletal:      Cervical back: Neck supple.      Left lower leg: No edema.   Neurological:      Mental Status: She is alert.         Assessment/Plan   Celia Shahid is a 63  year old with a PMHx of DVT/PE post surgery , DVT- 04- ocps, Depression, Asthma (controlled), Psoriasis, DSL who presents for depression follow up.     Anxiety  controlled   refilled venlafaxine to 262.5mg daily      DVT/PE  indefinite anticoagulation  eliquis 2.5mg BID   *pt second provoked DVT with family history of DVT        Seasonal allergies   *Flonase continue      Health Maintenance   mammogram UTD 6/23 fu I year  influenza vaccine   Pap 10/6/2023 -/-      6 month follow up for ellie Parish DO 01/23/24 11:45 AM   "

## 2024-01-24 LAB
EST. AVERAGE GLUCOSE BLD GHB EST-MCNC: 120 MG/DL
HBA1C MFR BLD: 5.8 %

## 2024-03-21 DIAGNOSIS — R21 RASH: ICD-10-CM

## 2024-03-21 DIAGNOSIS — R35.0 URINARY FREQUENCY: ICD-10-CM

## 2024-03-23 RX ORDER — NYSTATIN 100000 U/G
CREAM TOPICAL
Qty: 30 G | Refills: 0 | Status: SHIPPED | OUTPATIENT
Start: 2024-03-23

## 2024-03-23 RX ORDER — OXYBUTYNIN CHLORIDE 15 MG/1
TABLET, EXTENDED RELEASE ORAL
Qty: 90 TABLET | Refills: 1 | Status: SHIPPED | OUTPATIENT
Start: 2024-03-23

## 2024-04-16 ENCOUNTER — LAB REQUISITION (OUTPATIENT)
Dept: LAB | Facility: HOSPITAL | Age: 64
End: 2024-04-16
Payer: MEDICAID

## 2024-04-16 DIAGNOSIS — N39.0 URINARY TRACT INFECTION, SITE NOT SPECIFIED: ICD-10-CM

## 2024-04-16 PROCEDURE — 87086 URINE CULTURE/COLONY COUNT: CPT

## 2024-04-18 LAB — BACTERIA UR CULT: NORMAL

## 2024-06-07 ENCOUNTER — LAB REQUISITION (OUTPATIENT)
Dept: LAB | Facility: HOSPITAL | Age: 64
End: 2024-06-07
Payer: MEDICAID

## 2024-06-07 DIAGNOSIS — R30.0 DYSURIA: ICD-10-CM

## 2024-06-07 DIAGNOSIS — N39.0 URINARY TRACT INFECTION, SITE NOT SPECIFIED: ICD-10-CM

## 2024-06-07 PROCEDURE — 87086 URINE CULTURE/COLONY COUNT: CPT

## 2024-06-09 LAB — BACTERIA UR CULT: NO GROWTH

## 2024-06-29 DIAGNOSIS — J30.9 ALLERGIC RHINITIS, UNSPECIFIED SEASONALITY, UNSPECIFIED TRIGGER: ICD-10-CM

## 2024-07-01 ENCOUNTER — LAB REQUISITION (OUTPATIENT)
Dept: LAB | Facility: HOSPITAL | Age: 64
End: 2024-07-01
Payer: MEDICAID

## 2024-07-01 DIAGNOSIS — N39.0 URINARY TRACT INFECTION, SITE NOT SPECIFIED: ICD-10-CM

## 2024-07-01 PROCEDURE — 87086 URINE CULTURE/COLONY COUNT: CPT

## 2024-07-01 RX ORDER — FLUTICASONE PROPIONATE 50 MCG
2 SPRAY, SUSPENSION (ML) NASAL DAILY
Qty: 16 G | Refills: 0 | Status: SHIPPED | OUTPATIENT
Start: 2024-07-01

## 2024-07-02 LAB — BACTERIA UR CULT: NORMAL

## 2024-07-23 ENCOUNTER — APPOINTMENT (OUTPATIENT)
Dept: PRIMARY CARE | Facility: CLINIC | Age: 64
End: 2024-07-23
Payer: MEDICAID

## 2024-07-23 ENCOUNTER — LAB (OUTPATIENT)
Dept: LAB | Facility: LAB | Age: 64
End: 2024-07-23
Payer: MEDICAID

## 2024-07-23 VITALS
HEIGHT: 64 IN | OXYGEN SATURATION: 97 % | DIASTOLIC BLOOD PRESSURE: 66 MMHG | SYSTOLIC BLOOD PRESSURE: 106 MMHG | BODY MASS INDEX: 34.38 KG/M2 | HEART RATE: 63 BPM | WEIGHT: 201.4 LBS

## 2024-07-23 DIAGNOSIS — D68.51 HETEROZYGOUS FACTOR V LEIDEN MUTATION (MULTI): ICD-10-CM

## 2024-07-23 DIAGNOSIS — R73.03 PREDIABETES: ICD-10-CM

## 2024-07-23 DIAGNOSIS — I82.5Y9 CHRONIC DEEP VEIN THROMBOSIS (DVT) OF PROXIMAL VEIN OF LOWER EXTREMITY, UNSPECIFIED LATERALITY (MULTI): ICD-10-CM

## 2024-07-23 DIAGNOSIS — J45.909 ASTHMA, UNSPECIFIED ASTHMA SEVERITY, UNSPECIFIED WHETHER COMPLICATED, UNSPECIFIED WHETHER PERSISTENT (HHS-HCC): ICD-10-CM

## 2024-07-23 DIAGNOSIS — F32.A DEPRESSION, UNSPECIFIED DEPRESSION TYPE: ICD-10-CM

## 2024-07-23 DIAGNOSIS — Z12.11 COLON CANCER SCREENING: ICD-10-CM

## 2024-07-23 DIAGNOSIS — R73.03 PREDIABETES: Primary | ICD-10-CM

## 2024-07-23 DIAGNOSIS — N89.8 VAGINAL DRYNESS: ICD-10-CM

## 2024-07-23 DIAGNOSIS — E78.5 HYPERLIPIDEMIA, UNSPECIFIED HYPERLIPIDEMIA TYPE: ICD-10-CM

## 2024-07-23 LAB
BASOPHILS # BLD AUTO: 0.1 X10*3/UL (ref 0–0.1)
BASOPHILS NFR BLD AUTO: 1.4 %
EOSINOPHIL # BLD AUTO: 0.91 X10*3/UL (ref 0–0.7)
EOSINOPHIL NFR BLD AUTO: 12.5 %
ERYTHROCYTE [DISTWIDTH] IN BLOOD BY AUTOMATED COUNT: 13.7 % (ref 11.5–14.5)
HCT VFR BLD AUTO: 40.2 % (ref 36–46)
HGB BLD-MCNC: 12.8 G/DL (ref 12–16)
IMM GRANULOCYTES # BLD AUTO: 0.01 X10*3/UL (ref 0–0.7)
IMM GRANULOCYTES NFR BLD AUTO: 0.1 % (ref 0–0.9)
LYMPHOCYTES # BLD AUTO: 2.27 X10*3/UL (ref 1.2–4.8)
LYMPHOCYTES NFR BLD AUTO: 31.2 %
MCH RBC QN AUTO: 28.7 PG (ref 26–34)
MCHC RBC AUTO-ENTMCNC: 31.8 G/DL (ref 32–36)
MCV RBC AUTO: 90 FL (ref 80–100)
MONOCYTES # BLD AUTO: 0.67 X10*3/UL (ref 0.1–1)
MONOCYTES NFR BLD AUTO: 9.2 %
NEUTROPHILS # BLD AUTO: 3.31 X10*3/UL (ref 1.2–7.7)
NEUTROPHILS NFR BLD AUTO: 45.6 %
NRBC BLD-RTO: 0 /100 WBCS (ref 0–0)
PLATELET # BLD AUTO: 336 X10*3/UL (ref 150–450)
RBC # BLD AUTO: 4.46 X10*6/UL (ref 4–5.2)
WBC # BLD AUTO: 7.3 X10*3/UL (ref 4.4–11.3)

## 2024-07-23 PROCEDURE — 36415 COLL VENOUS BLD VENIPUNCTURE: CPT

## 2024-07-23 PROCEDURE — 99214 OFFICE O/P EST MOD 30 MIN: CPT | Performed by: STUDENT IN AN ORGANIZED HEALTH CARE EDUCATION/TRAINING PROGRAM

## 2024-07-23 PROCEDURE — 85025 COMPLETE CBC W/AUTO DIFF WBC: CPT

## 2024-07-23 PROCEDURE — 99396 PREV VISIT EST AGE 40-64: CPT | Performed by: STUDENT IN AN ORGANIZED HEALTH CARE EDUCATION/TRAINING PROGRAM

## 2024-07-23 PROCEDURE — 83036 HEMOGLOBIN GLYCOSYLATED A1C: CPT

## 2024-07-23 PROCEDURE — 3008F BODY MASS INDEX DOCD: CPT | Performed by: STUDENT IN AN ORGANIZED HEALTH CARE EDUCATION/TRAINING PROGRAM

## 2024-07-23 RX ORDER — GLYCERIN/MIN OIL/POLYCARBOPHIL
1 GEL WITH APPLICATOR (GRAM) VAGINAL AS NEEDED
Qty: 42.45 G | Refills: 2 | Status: SHIPPED | OUTPATIENT
Start: 2024-07-23

## 2024-07-23 RX ORDER — VENLAFAXINE HYDROCHLORIDE 150 MG/1
150 CAPSULE, EXTENDED RELEASE ORAL DAILY
Qty: 90 CAPSULE | Refills: 1 | Status: SHIPPED | OUTPATIENT
Start: 2024-07-23

## 2024-07-23 NOTE — PROGRESS NOTES
History Of Present Illness  Celia Shahid is a 63 y.o. female presents for cpe.     Continues to have a lot of vaginal pressure.   Painful intercourse  Painful urination        Pt has trouble with   She is not tired and feels that she cannot go to sleep   Falling asleep in the morning, around 7am  Previously tried: melatonin (doesn't work), meditation          Past Medical History  She has a past medical history of Encounter for screening for malignant neoplasm of cervix (12/11/2013), GERD (gastroesophageal reflux disease), Personal history of other diseases of the nervous system and sense organs, Personal history of other medical treatment, Personal history of pulmonary embolism, Pulmonary emboli (Multi), Sleep apnea, Type O blood, Rh positive, and Unspecified hemorrhoids (12/13/2013).    Surgical History  She has a past surgical history that includes Carpal tunnel release (Bilateral, 01/30/2014); Appendectomy (01/30/2014); Foot surgery (Bilateral, 01/30/2014); Esophagogastroduodenoscopy (01/30/2014); and Colonoscopy w/ polypectomy (01/30/2014).     Social History  She reports that she has never smoked. She has never used smokeless tobacco. She reports that she does not currently use alcohol. She reports that she does not currently use drugs.    Family History  Family History   Problem Relation Name Age of Onset    Heart failure Father          Allergies  Oxaprozin       Physical Exam  Vitals reviewed.   Constitutional:       General: She is not in acute distress.     Appearance: She is not ill-appearing.   HENT:      Right Ear: Tympanic membrane and ear canal normal.      Left Ear: Tympanic membrane and ear canal normal.      Mouth/Throat:      Mouth: Mucous membranes are moist.      Pharynx: Oropharynx is clear. No oropharyngeal exudate or posterior oropharyngeal erythema.   Eyes:      Extraocular Movements: Extraocular movements intact.      Conjunctiva/sclera: Conjunctivae normal.      Pupils: Pupils are  equal, round, and reactive to light.   Neck:      Vascular: No carotid bruit.   Cardiovascular:      Rate and Rhythm: Normal rate and regular rhythm.      Heart sounds: No murmur heard.     No gallop.   Pulmonary:      Effort: Pulmonary effort is normal.      Breath sounds: Normal breath sounds. No wheezing, rhonchi or rales.   Abdominal:      General: Abdomen is flat. Bowel sounds are normal.      Palpations: Abdomen is soft.      Tenderness: There is no abdominal tenderness.   Musculoskeletal:      Cervical back: Neck supple.      Left lower leg: No edema.   Skin:     General: Skin is warm and dry.      Findings: No rash.   Neurological:      General: No focal deficit present.      Mental Status: She is alert and oriented to person, place, and time.      Gait: Gait normal.   Psychiatric:         Mood and Affect: Mood normal.         Behavior: Behavior normal.          Last Recorded Vitals  /66   Pulse 63   Wt 91.4 kg (201 lb 6.4 oz)   SpO2 97%     Relevant Results      Current Outpatient Medications:     apixaban (Eliquis) 5 mg tablet, Take 1 tablet (5 mg) by mouth 2 times a day., Disp: 180 tablet, Rfl: 3    fluticasone (Flonase) 50 mcg/actuation nasal spray, Use 2 spray(s) in each nostril once daily, Disp: 16 g, Rfl: 0    nystatin (Mycostatin) cream, APPLY TOPICALLY AND RUB IN A THIN FILM TO AFFECTED AREAS (BETWEEN FOLDS) IF NEEDED TWICE DAILY (IN THE MORNING AND IN THE EVENING), Disp: 30 g, Rfl: 0    omeprazole OTC (PriLOSEC OTC) 20 mg EC tablet, Take 1 tablet (20 mg) by mouth once daily., Disp: , Rfl:     oxybutynin XL (Ditropan-XL) 15 mg 24 hr tablet, Take 1 tablet by mouth once daily, Disp: 90 tablet, Rfl: 1    triamcinolone (Kenalog) 0.025 % cream, APPLY SPARINGLY TO AFFECTED AREA(S) 2 TO 3 TIMES A DAY on your hands, Disp: , Rfl:     venlafaxine XR (Effexor-XR) 150 mg 24 hr capsule, Take 1 capsule (150 mg) by mouth once daily. Do not crush or chew., Disp: 90 capsule, Rfl: 1        Assessment/Plan    Diagnoses and all orders for this visit:  Prediabetes  -     Hemoglobin A1C; Future  Depression, unspecified depression type  -     venlafaxine XR (Effexor-XR) 150 mg 24 hr capsule; Take 1 capsule (150 mg) by mouth once daily. Do not crush or chew.  -     CBC and Auto Differential; Future  Heterozygous factor V Leiden mutation (Multi)  Asthma, unspecified asthma severity, unspecified whether complicated, unspecified whether persistent (HHS-HCC)  Chronic deep vein thrombosis (DVT) of proximal vein of lower extremity, unspecified laterality (Multi)  Hyperlipidemia, unspecified hyperlipidemia type  Colon cancer screening  -     Cologuard® colon cancer screening; Future  Vaginal dryness  -     glycerin-min oil-polycarbophil (Replens External Comfort) gel; Insert 1 Application into the vagina if needed (for vaginal dryness).    Celia Shahid is a 63  year old with a PMHx of DVT/PE post surgery , DVT- 04- ocps, Depression, Asthma (controlled), Psoriasis, DSL who presents for cpe.      Anxiety  Continues to have trouble falling alseep   Tried melatonin without success   Trial of magnesium glycinate 250mg to 400mg nightly   Refilled venlafaxine to 150mg daily   Next steps: increase venlafaxine      DVT/PE  indefinite anticoagulation  Factor V Leiden heterozygous   eliquis 2.5mg BID   *pt second provoked DVT with family history of DVT    Vaginal Dryness  Trial of replens   Consider vaginal estrogen as next step      Seasonal allergies   *Flonase continue      Health Maintenance   mammogram UTD 6/23 fu I year  influenza vaccine   Pap 10/6/2023 -/-      Health Maintenance   Topic Date Due    Yearly Adult Physical  Never done    HIV Screening  Never done    MMR Vaccines (1 of 1 - Standard series) Never done    Pneumococcal Vaccine: Pediatrics (0 to 5 Years) and At-Risk Patients (6 to 64 Years) (1 of 2 - PCV) Never done    Hepatitis C Screening  Never done    Zoster Vaccines (1 of 2) Never done    RSV Pregnant patients and/or   patients aged 60+ years (1 - 1-dose 60+ series) Never done    Colorectal Cancer Screening  04/08/2023    COVID-19 Vaccine (1 - 2023-24 season) Never done    Mammogram  06/22/2024    Influenza Vaccine (1) 09/01/2024    Diabetes: Hemoglobin A1C  01/23/2025    Diabetes Screening  01/23/2025    Cervical Cancer Screening  10/06/2028    Lipid Panel  01/23/2029    DTaP/Tdap/Td Vaccines (2 - Td or Tdap) 06/15/2033    HIB Vaccines  Aged Out    Hepatitis B Vaccines  Aged Out    IPV Vaccines  Aged Out    Hepatitis A Vaccines  Aged Out    Meningococcal Vaccine  Aged Out    Rotavirus Vaccines  Aged Out    HPV Vaccines  Aged Out    Irritable Bowel Syndrome  Discontinued            Mahnaz Parish, DO

## 2024-07-24 LAB
EST. AVERAGE GLUCOSE BLD GHB EST-MCNC: 131 MG/DL
HBA1C MFR BLD: 6.2 %

## 2024-10-12 DIAGNOSIS — R35.0 URINARY FREQUENCY: ICD-10-CM

## 2024-10-14 RX ORDER — OXYBUTYNIN CHLORIDE 15 MG/1
TABLET, EXTENDED RELEASE ORAL
Qty: 90 TABLET | Refills: 1 | Status: SHIPPED | OUTPATIENT
Start: 2024-10-14

## 2025-01-09 ENCOUNTER — APPOINTMENT (OUTPATIENT)
Dept: HEMATOLOGY/ONCOLOGY | Facility: CLINIC | Age: 65
End: 2025-01-09
Payer: MEDICAID

## 2025-01-23 ENCOUNTER — APPOINTMENT (OUTPATIENT)
Dept: PRIMARY CARE | Facility: CLINIC | Age: 65
End: 2025-01-23
Payer: MEDICAID

## 2025-01-23 VITALS
OXYGEN SATURATION: 96 % | DIASTOLIC BLOOD PRESSURE: 64 MMHG | WEIGHT: 206.6 LBS | HEART RATE: 76 BPM | HEIGHT: 64 IN | BODY MASS INDEX: 35.27 KG/M2 | SYSTOLIC BLOOD PRESSURE: 112 MMHG

## 2025-01-23 DIAGNOSIS — H61.22 IMPACTED CERUMEN OF LEFT EAR: ICD-10-CM

## 2025-01-23 DIAGNOSIS — K21.9 GASTROESOPHAGEAL REFLUX DISEASE WITHOUT ESOPHAGITIS: Primary | ICD-10-CM

## 2025-01-23 DIAGNOSIS — E78.2 MIXED HYPERLIPIDEMIA: ICD-10-CM

## 2025-01-23 DIAGNOSIS — F32.A DEPRESSION, UNSPECIFIED DEPRESSION TYPE: ICD-10-CM

## 2025-01-23 DIAGNOSIS — D68.51 HETEROZYGOUS FACTOR V LEIDEN MUTATION (MULTI): ICD-10-CM

## 2025-01-23 DIAGNOSIS — I26.99 PULMONARY EMBOLISM, UNSPECIFIED CHRONICITY, UNSPECIFIED PULMONARY EMBOLISM TYPE, UNSPECIFIED WHETHER ACUTE COR PULMONALE PRESENT (MULTI): ICD-10-CM

## 2025-01-23 DIAGNOSIS — R35.0 URINARY FREQUENCY: ICD-10-CM

## 2025-01-23 DIAGNOSIS — R35.0 INCREASED URINARY FREQUENCY: ICD-10-CM

## 2025-01-23 DIAGNOSIS — J45.909 ASTHMA, UNSPECIFIED ASTHMA SEVERITY, UNSPECIFIED WHETHER COMPLICATED, UNSPECIFIED WHETHER PERSISTENT (HHS-HCC): ICD-10-CM

## 2025-01-23 DIAGNOSIS — R73.03 PREDIABETES: ICD-10-CM

## 2025-01-23 DIAGNOSIS — J30.9 ALLERGIC RHINITIS, UNSPECIFIED SEASONALITY, UNSPECIFIED TRIGGER: ICD-10-CM

## 2025-01-23 DIAGNOSIS — R21 RASH: ICD-10-CM

## 2025-01-23 PROCEDURE — 99214 OFFICE O/P EST MOD 30 MIN: CPT | Performed by: STUDENT IN AN ORGANIZED HEALTH CARE EDUCATION/TRAINING PROGRAM

## 2025-01-23 PROCEDURE — 3008F BODY MASS INDEX DOCD: CPT | Performed by: STUDENT IN AN ORGANIZED HEALTH CARE EDUCATION/TRAINING PROGRAM

## 2025-01-23 RX ORDER — FLUTICASONE PROPIONATE 50 MCG
2 SPRAY, SUSPENSION (ML) NASAL DAILY
Qty: 16 G | Refills: 3 | Status: SHIPPED | OUTPATIENT
Start: 2025-01-23

## 2025-01-23 RX ORDER — OMEPRAZOLE 20 MG/1
20 TABLET, DELAYED RELEASE ORAL
Qty: 90 TABLET | Refills: 1 | Status: SHIPPED | OUTPATIENT
Start: 2025-01-23

## 2025-01-23 RX ORDER — NYSTATIN 100000 U/G
CREAM TOPICAL AS NEEDED
Qty: 30 G | Refills: 0 | Status: SHIPPED | OUTPATIENT
Start: 2025-01-23

## 2025-01-23 RX ORDER — VENLAFAXINE HYDROCHLORIDE 150 MG/1
150 CAPSULE, EXTENDED RELEASE ORAL DAILY
Qty: 90 CAPSULE | Refills: 1 | Status: SHIPPED | OUTPATIENT
Start: 2025-01-23

## 2025-01-23 RX ORDER — OXYBUTYNIN CHLORIDE 15 MG/1
15 TABLET, EXTENDED RELEASE ORAL DAILY
Qty: 90 TABLET | Refills: 1 | Status: SHIPPED | OUTPATIENT
Start: 2025-01-23

## 2025-01-23 RX ORDER — TRIAMCINOLONE ACETONIDE 0.25 MG/G
CREAM TOPICAL
Status: CANCELLED | OUTPATIENT
Start: 2025-01-23

## 2025-01-23 NOTE — PROGRESS NOTES
"Subjective   Patient ID: Celia Shahid is a 64 y.o. female who presents for Follow-up (Pt is here for a 6 mo follow up.).    HPI     Obesity   Pt states that she is struggling with motivation to be active.   BMI  35.46 Kg/m2         Current Outpatient Medications:     fluticasone (Flonase) 50 mcg/actuation nasal spray, Use 2 spray(s) in each nostril once daily, Disp: 16 g, Rfl: 0    nystatin (Mycostatin) cream, APPLY TOPICALLY AND RUB IN A THIN FILM TO AFFECTED AREAS (BETWEEN FOLDS) IF NEEDED TWICE DAILY (IN THE MORNING AND IN THE EVENING), Disp: 30 g, Rfl: 0    omeprazole OTC (PriLOSEC OTC) 20 mg EC tablet, Take 1 tablet (20 mg) by mouth once daily., Disp: , Rfl:     oxybutynin XL (Ditropan-XL) 15 mg 24 hr tablet, Take 1 tablet by mouth once daily, Disp: 90 tablet, Rfl: 1    triamcinolone (Kenalog) 0.025 % cream, APPLY SPARINGLY TO AFFECTED AREA(S) 2 TO 3 TIMES A DAY on your hands, Disp: , Rfl:     venlafaxine XR (Effexor-XR) 150 mg 24 hr capsule, Take 1 capsule (150 mg) by mouth once daily. Do not crush or chew., Disp: 90 capsule, Rfl: 1    apixaban (Eliquis) 5 mg tablet, Take 1 tablet (5 mg) by mouth 2 times a day., Disp: 180 tablet, Rfl: 3    glycerin-min oil-polycarbophil (Replens External Comfort) gel, Insert 1 Application into the vagina if needed (for vaginal dryness). (Patient not taking: Reported on 1/23/2025), Disp: 42.45 g, Rfl: 2    Visit Vitals  /64   Pulse 76   Ht 1.626 m (5' 4\")   Wt 93.7 kg (206 lb 9.6 oz)   SpO2 96%   BMI 35.46 kg/m²   Smoking Status Never   BSA 2.06 m²         Objective   Physical Exam  Vitals reviewed.   Constitutional:       Appearance: Normal appearance.   Cardiovascular:      Rate and Rhythm: Normal rate and regular rhythm.      Heart sounds: No murmur heard.  Pulmonary:      Effort: Pulmonary effort is normal. No respiratory distress.      Breath sounds: Normal breath sounds. No wheezing.   Musculoskeletal:      Cervical back: Neck supple.      Left lower leg: No " edema.   Neurological:      Mental Status: She is alert.         Assessment/Plan   Diagnoses and all orders for this visit:  Depression, unspecified depression type  Urinary frequency  Rash  Allergic rhinitis, unspecified seasonality, unspecified trigger    Celiamic Shahid is a 64 year old with a PMHx of DVT/PE post surgery , DVT- 04- ocps, Depression, Asthma (controlled), Psoriasis, DSL who presents for depression follow up.    Prediabetes  A1C 6.2%   Repeat today      Anxiety  controlled   refilled venlafaxine to 262.5mg daily      Hx of  DVT/PE  HETEROZYGOUS for Factor V Leiden  indefinite anticoagulation  Refilled eliquis 2.5mg BID   pt second provoked DVT with family history of DVT     Obesity   Pt states that she is struggling with motivation to be active.   BMI  35.46 Kg/m2   Pt would like to work on her exercise  Discussed nutritionist and medication supplemental help    Asthma   Controlled     Seasonal allergies   *Flonase continue      Health Maintenance   mammogram UTD 6/23 fu I year  influenza vaccine   Pap 10/6/2023 -/-      6 month follow up for cpe        Mahnaz Parish DO 01/23/25 9:45 AM

## 2025-01-27 ENCOUNTER — LAB (OUTPATIENT)
Dept: LAB | Facility: CLINIC | Age: 65
End: 2025-01-27
Payer: MEDICAID

## 2025-01-27 ENCOUNTER — OFFICE VISIT (OUTPATIENT)
Dept: HEMATOLOGY/ONCOLOGY | Facility: CLINIC | Age: 65
End: 2025-01-27
Payer: MEDICAID

## 2025-01-27 VITALS
WEIGHT: 206.57 LBS | BODY MASS INDEX: 35.27 KG/M2 | DIASTOLIC BLOOD PRESSURE: 83 MMHG | TEMPERATURE: 97.5 F | OXYGEN SATURATION: 98 % | SYSTOLIC BLOOD PRESSURE: 140 MMHG | HEART RATE: 63 BPM | RESPIRATION RATE: 18 BRPM | HEIGHT: 64 IN

## 2025-01-27 DIAGNOSIS — D68.51 HETEROZYGOUS FACTOR V LEIDEN MUTATION (MULTI): ICD-10-CM

## 2025-01-27 DIAGNOSIS — E78.2 MIXED HYPERLIPIDEMIA: ICD-10-CM

## 2025-01-27 DIAGNOSIS — I26.99 PULMONARY EMBOLISM, UNSPECIFIED CHRONICITY, UNSPECIFIED PULMONARY EMBOLISM TYPE, UNSPECIFIED WHETHER ACUTE COR PULMONALE PRESENT (MULTI): ICD-10-CM

## 2025-01-27 DIAGNOSIS — F32.A DEPRESSION, UNSPECIFIED DEPRESSION TYPE: ICD-10-CM

## 2025-01-27 DIAGNOSIS — I82.569 CHRONIC DEEP VEIN THROMBOSIS (DVT) OF CALF MUSCLE VEIN, UNSPECIFIED LATERALITY (MULTI): Primary | ICD-10-CM

## 2025-01-27 DIAGNOSIS — R73.03 PREDIABETES: ICD-10-CM

## 2025-01-27 DIAGNOSIS — I82.569 CHRONIC DEEP VEIN THROMBOSIS (DVT) OF CALF MUSCLE VEIN, UNSPECIFIED LATERALITY (MULTI): ICD-10-CM

## 2025-01-27 LAB
ALBUMIN SERPL BCP-MCNC: 4.2 G/DL (ref 3.4–5)
ALP SERPL-CCNC: 91 U/L (ref 33–136)
ALT SERPL W P-5'-P-CCNC: 15 U/L (ref 7–45)
ANION GAP SERPL CALC-SCNC: 11 MMOL/L (ref 10–20)
AST SERPL W P-5'-P-CCNC: 17 U/L (ref 9–39)
BASOPHILS # BLD AUTO: 0.08 X10*3/UL (ref 0–0.1)
BASOPHILS NFR BLD AUTO: 1.1 %
BILIRUB SERPL-MCNC: 0.4 MG/DL (ref 0–1.2)
BUN SERPL-MCNC: 18 MG/DL (ref 6–23)
CALCIUM SERPL-MCNC: 9.1 MG/DL (ref 8.6–10.3)
CHLORIDE SERPL-SCNC: 105 MMOL/L (ref 98–107)
CHOLEST SERPL-MCNC: 237 MG/DL (ref 0–199)
CHOLESTEROL/HDL RATIO: 4
CO2 SERPL-SCNC: 30 MMOL/L (ref 21–32)
CREAT SERPL-MCNC: 0.73 MG/DL (ref 0.5–1.05)
EGFRCR SERPLBLD CKD-EPI 2021: >90 ML/MIN/1.73M*2
EOSINOPHIL # BLD AUTO: 0.32 X10*3/UL (ref 0–0.7)
EOSINOPHIL NFR BLD AUTO: 4.4 %
ERYTHROCYTE [DISTWIDTH] IN BLOOD BY AUTOMATED COUNT: 13.2 % (ref 11.5–14.5)
EST. AVERAGE GLUCOSE BLD GHB EST-MCNC: 123 MG/DL
GLUCOSE SERPL-MCNC: 91 MG/DL (ref 74–99)
HBA1C MFR BLD: 5.9 %
HCT VFR BLD AUTO: 37.6 % (ref 36–46)
HDLC SERPL-MCNC: 58.9 MG/DL
HGB BLD-MCNC: 12.1 G/DL (ref 12–16)
IMM GRANULOCYTES # BLD AUTO: 0.02 X10*3/UL (ref 0–0.7)
IMM GRANULOCYTES NFR BLD AUTO: 0.3 % (ref 0–0.9)
LDLC SERPL CALC-MCNC: 152 MG/DL
LYMPHOCYTES # BLD AUTO: 2.17 X10*3/UL (ref 1.2–4.8)
LYMPHOCYTES NFR BLD AUTO: 30.1 %
MCH RBC QN AUTO: 28.7 PG (ref 26–34)
MCHC RBC AUTO-ENTMCNC: 32.2 G/DL (ref 32–36)
MCV RBC AUTO: 89 FL (ref 80–100)
MONOCYTES # BLD AUTO: 0.67 X10*3/UL (ref 0.1–1)
MONOCYTES NFR BLD AUTO: 9.3 %
NEUTROPHILS # BLD AUTO: 3.96 X10*3/UL (ref 1.2–7.7)
NEUTROPHILS NFR BLD AUTO: 54.8 %
NON HDL CHOLESTEROL: 178 MG/DL (ref 0–149)
NRBC BLD-RTO: 0 /100 WBCS (ref 0–0)
PLATELET # BLD AUTO: 249 X10*3/UL (ref 150–450)
POTASSIUM SERPL-SCNC: 3.9 MMOL/L (ref 3.5–5.3)
PROT SERPL-MCNC: 6.8 G/DL (ref 6.4–8.2)
RBC # BLD AUTO: 4.22 X10*6/UL (ref 4–5.2)
SODIUM SERPL-SCNC: 142 MMOL/L (ref 136–145)
TRIGL SERPL-MCNC: 130 MG/DL (ref 0–149)
VLDL: 26 MG/DL (ref 0–40)
WBC # BLD AUTO: 7.2 X10*3/UL (ref 4.4–11.3)

## 2025-01-27 PROCEDURE — 80061 LIPID PANEL: CPT

## 2025-01-27 PROCEDURE — 99214 OFFICE O/P EST MOD 30 MIN: CPT | Performed by: NURSE PRACTITIONER

## 2025-01-27 PROCEDURE — 84075 ASSAY ALKALINE PHOSPHATASE: CPT

## 2025-01-27 PROCEDURE — 36415 COLL VENOUS BLD VENIPUNCTURE: CPT

## 2025-01-27 PROCEDURE — 85025 COMPLETE CBC W/AUTO DIFF WBC: CPT

## 2025-01-27 PROCEDURE — 83036 HEMOGLOBIN GLYCOSYLATED A1C: CPT

## 2025-01-27 PROCEDURE — 3008F BODY MASS INDEX DOCD: CPT | Performed by: NURSE PRACTITIONER

## 2025-01-27 ASSESSMENT — PAIN SCALES - GENERAL: PAINLEVEL_OUTOF10: 0-NO PAIN

## 2025-01-27 NOTE — PROGRESS NOTES
"Patient ID: Celia Shahid is a 64 y.o. female.    Primary Care Provider: Mahnaz Parish DO  Visit Type: Follow Up      Subjective    HPI  64-year-old woman who has a history of DVT and PE, initially had a DVT and PE in her late 40s.  At that time, she was on birth control pills. She was treated with Coumadin for approximately one year's time. She then presented postoperativly with shortness of beath in September of 2019 and was found to have right lung PE and left lower extremity  DVT. She was treated with heparin and sent home on Eliquis, which she has continued to date, Eliquis 5mg BID.  HETEROZYGOUS for Factor V Leiden, prothrombin gene mutation negative. Plan is for life long anticoagulation.      Interval History:  Patient returns today for routine follow-up evaluation for recurrent thrombosis and factor V Leiden heterozygosity.  She remains on Eliquis and is taking it as directed, planned lifelong therapy. On presentation today she denies any fevers, chills or night sweats. No cough, chest pain or shortness of breath. No nausea or vomiting. No constipation or diarrhea. No signs or symptoms of active bleeding or unusual bruising.  No surgical interventions over the past year and no upcoming surgeries planned.  Patient is primary caregiver for her mother who is now moved in with her and is exhibiting signs of dementia.    Review of Systems - Oncology Asymptomatic    Objective   BSA: 2.06 meters squared  /83 (BP Location: Right arm, Patient Position: Sitting, BP Cuff Size: Adult long)   Pulse 63   Temp 36.4 °C (97.5 °F) (Temporal)   Resp 18   Ht 1.634 m (5' 4.33\")   Wt 93.7 kg (206 lb 9.1 oz)   SpO2 98%   BMI 35.09 kg/m²      has a past medical history of Encounter for screening for malignant neoplasm of cervix (12/11/2013), GERD (gastroesophageal reflux disease), Personal history of other diseases of the nervous system and sense organs, Personal history of other medical treatment, Personal history " of pulmonary embolism, Pulmonary emboli, Sleep apnea, Type O blood, Rh positive, and Unspecified hemorrhoids (12/13/2013).   has a past surgical history that includes Carpal tunnel release (Bilateral, 01/30/2014); Appendectomy (01/30/2014); Foot surgery (Bilateral, 01/30/2014); Esophagogastroduodenoscopy (01/30/2014); and Colonoscopy w/ polypectomy (01/30/2014).  Family History   Problem Relation Name Age of Onset    Heart failure Father           Celia Shahid  reports that she has never smoked. She has never used smokeless tobacco.  She  reports that she does not currently use alcohol.  She  reports that she does not currently use drugs.    Physical Exam  Constitutional:       Appearance: Normal appearance.   Eyes:      Conjunctiva/sclera: Conjunctivae normal.      Pupils: Pupils are equal, round, and reactive to light.   Cardiovascular:      Rate and Rhythm: Normal rate and regular rhythm.      Pulses: Normal pulses.      Heart sounds: Normal heart sounds. No murmur heard.  Pulmonary:      Effort: Pulmonary effort is normal. No respiratory distress.      Breath sounds: Normal breath sounds. No stridor. No rhonchi.   Abdominal:      General: There is no distension.      Palpations: Abdomen is soft. There is no mass.      Tenderness: There is no abdominal tenderness.   Musculoskeletal:         General: No swelling or tenderness. Normal range of motion.      Right lower leg: No edema.      Left lower leg: No edema.   Lymphadenopathy:      Cervical: No cervical adenopathy.   Skin:     Coloration: Skin is not jaundiced or pale.      Findings: No bruising.   Neurological:      Motor: No weakness.     WBC   Date/Time Value Ref Range Status   01/27/2025 08:17 AM 7.2 4.4 - 11.3 x10*3/uL Final   07/23/2024 10:50 AM 7.3 4.4 - 11.3 x10*3/uL Final   01/23/2024 12:29 PM 5.9 4.4 - 11.3 x10*3/uL Final     nRBC   Date Value Ref Range Status   01/27/2025 0.0 0.0 - 0.0 /100 WBCs Final   07/23/2024 0.0 0.0 - 0.0 /100 WBCs Final    01/23/2024 0.0 0.0 - 0.0 /100 WBCs Final     RBC   Date Value Ref Range Status   01/27/2025 4.22 4.00 - 5.20 x10*6/uL Final   07/23/2024 4.46 4.00 - 5.20 x10*6/uL Final   01/23/2024 4.50 4.00 - 5.20 x10*6/uL Final     Hemoglobin   Date Value Ref Range Status   01/27/2025 12.1 12.0 - 16.0 g/dL Final   07/23/2024 12.8 12.0 - 16.0 g/dL Final   01/23/2024 12.7 12.0 - 16.0 g/dL Final     Hematocrit   Date Value Ref Range Status   01/27/2025 37.6 36.0 - 46.0 % Final   07/23/2024 40.2 36.0 - 46.0 % Final   01/23/2024 40.1 36.0 - 46.0 % Final     MCV   Date/Time Value Ref Range Status   01/27/2025 08:17 AM 89 80 - 100 fL Final   07/23/2024 10:50 AM 90 80 - 100 fL Final   01/23/2024 12:29 PM 89 80 - 100 fL Final     MCH   Date/Time Value Ref Range Status   01/27/2025 08:17 AM 28.7 26.0 - 34.0 pg Final   07/23/2024 10:50 AM 28.7 26.0 - 34.0 pg Final   01/23/2024 12:29 PM 28.2 26.0 - 34.0 pg Final     MCHC   Date/Time Value Ref Range Status   01/27/2025 08:17 AM 32.2 32.0 - 36.0 g/dL Final   07/23/2024 10:50 AM 31.8 (L) 32.0 - 36.0 g/dL Final   01/23/2024 12:29 PM 31.7 (L) 32.0 - 36.0 g/dL Final     RDW   Date/Time Value Ref Range Status   01/27/2025 08:17 AM 13.2 11.5 - 14.5 % Final   07/23/2024 10:50 AM 13.7 11.5 - 14.5 % Final   01/23/2024 12:29 PM 13.2 11.5 - 14.5 % Final     Platelets   Date/Time Value Ref Range Status   01/27/2025 08:17  150 - 450 x10*3/uL Final   07/23/2024 10:50  150 - 450 x10*3/uL Final   01/23/2024 12:29  150 - 450 x10*3/uL Final     MPV   Date/Time Value Ref Range Status   09/30/2021 08:29 AM 9.7 7.0 - 12.6 CU Final   03/11/2021 08:48 AM 9.0 7.0 - 12.6 CU Final   08/20/2020 09:34 AM 9.2 7.0 - 12.6 CU Final     Neutrophils %   Date/Time Value Ref Range Status   01/27/2025 08:17 AM 54.8 40.0 - 80.0 % Final   07/23/2024 10:50 AM 45.6 40.0 - 80.0 % Final   01/23/2024 12:29 PM 54.3 40.0 - 80.0 % Final     Immature Granulocytes %, Automated   Date/Time Value Ref Range Status    01/27/2025 08:17 AM 0.3 0.0 - 0.9 % Final     Comment:     Immature Granulocyte Count (IG) includes promyelocytes, myelocytes and metamyelocytes but does not include bands. Percent differential counts (%) should be interpreted in the context of the absolute cell counts (cells/UL).   07/23/2024 10:50 AM 0.1 0.0 - 0.9 % Final     Comment:     Immature Granulocyte Count (IG) includes promyelocytes, myelocytes and metamyelocytes but does not include bands. Percent differential counts (%) should be interpreted in the context of the absolute cell counts (cells/UL).   01/23/2024 12:29 PM 0.2 0.0 - 0.9 % Final     Comment:     Immature Granulocyte Count (IG) includes promyelocytes, myelocytes and metamyelocytes but does not include bands. Percent differential counts (%) should be interpreted in the context of the absolute cell counts (cells/UL).     Lymphocytes %   Date/Time Value Ref Range Status   01/27/2025 08:17 AM 30.1 13.0 - 44.0 % Final   07/23/2024 10:50 AM 31.2 13.0 - 44.0 % Final   01/23/2024 12:29 PM 28.0 13.0 - 44.0 % Final     Monocytes %   Date/Time Value Ref Range Status   01/27/2025 08:17 AM 9.3 2.0 - 10.0 % Final   07/23/2024 10:50 AM 9.2 2.0 - 10.0 % Final   01/23/2024 12:29 PM 8.3 2.0 - 10.0 % Final     Eosinophils %   Date/Time Value Ref Range Status   01/27/2025 08:17 AM 4.4 0.0 - 6.0 % Final   07/23/2024 10:50 AM 12.5 0.0 - 6.0 % Final   01/23/2024 12:29 PM 8.0 0.0 - 6.0 % Final     Basophils %   Date/Time Value Ref Range Status   01/27/2025 08:17 AM 1.1 0.0 - 2.0 % Final   07/23/2024 10:50 AM 1.4 0.0 - 2.0 % Final   01/23/2024 12:29 PM 1.2 0.0 - 2.0 % Final     Neutrophils Absolute   Date/Time Value Ref Range Status   01/27/2025 08:17 AM 3.96 1.20 - 7.70 x10*3/uL Final     Comment:     Percent differential counts (%) should be interpreted in the context of the absolute cell counts (cells/uL).   07/23/2024 10:50 AM 3.31 1.20 - 7.70 x10*3/uL Final     Comment:     Percent differential counts (%)  should be interpreted in the context of the absolute cell counts (cells/uL).   01/23/2024 12:29 PM 3.20 1.20 - 7.70 x10*3/uL Final     Comment:     Percent differential counts (%) should be interpreted in the context of the absolute cell counts (cells/uL).     Immature Granulocytes Absolute, Automated   Date/Time Value Ref Range Status   01/27/2025 08:17 AM 0.02 0.00 - 0.70 x10*3/uL Final   07/23/2024 10:50 AM 0.01 0.00 - 0.70 x10*3/uL Final   01/23/2024 12:29 PM 0.01 0.00 - 0.70 x10*3/uL Final     Lymphocytes Absolute   Date/Time Value Ref Range Status   01/27/2025 08:17 AM 2.17 1.20 - 4.80 x10*3/uL Final   07/23/2024 10:50 AM 2.27 1.20 - 4.80 x10*3/uL Final   01/23/2024 12:29 PM 1.65 1.20 - 4.80 x10*3/uL Final     Monocytes Absolute   Date/Time Value Ref Range Status   01/27/2025 08:17 AM 0.67 0.10 - 1.00 x10*3/uL Final   07/23/2024 10:50 AM 0.67 0.10 - 1.00 x10*3/uL Final   01/23/2024 12:29 PM 0.49 0.10 - 1.00 x10*3/uL Final     Eosinophils Absolute   Date/Time Value Ref Range Status   01/27/2025 08:17 AM 0.32 0.00 - 0.70 x10*3/uL Final   07/23/2024 10:50 AM 0.91 (H) 0.00 - 0.70 x10*3/uL Final   01/23/2024 12:29 PM 0.47 0.00 - 0.70 x10*3/uL Final     Basophils Absolute   Date/Time Value Ref Range Status   01/27/2025 08:17 AM 0.08 0.00 - 0.10 x10*3/uL Final   07/23/2024 10:50 AM 0.10 0.00 - 0.10 x10*3/uL Final   01/23/2024 12:29 PM 0.07 0.00 - 0.10 x10*3/uL Final       aPTT   Date/Time Value Ref Range Status   09/29/2019 05:16 AM 29.5 22.0 - 32.5 SEC Final     Comment:     Performed at [address] [address]   09/28/2019 06:41 AM 50.1 (H) 22.0 - 32.5 SEC Final     Comment:     Performed at [address] [address]   09/27/2019 06:46 AM 61.8 (H) 22.0 - 32.5 SEC Final     Comment:     Performed at [address] [address]       Assessment/Plan    This is a pleasant 64-year-old female with recurrent DVT and PE x2. She also carries a history of factor V Leiden. Current  dose of Eliquis is 5 mg twice daily. We  will continue this. The patient is on lifelong anticoagulation at this time. She is up to date with regular health maintenance including colonoscopy and mammograms.   A refill was placed for her Eliquis today     Plan:  - 1 year follow-up  - confirm CBCd and CMP are completed annually            Diagnoses and all orders for this visit:  Chronic deep vein thrombosis (DVT) of calf muscle vein, unspecified laterality (Multi)  -     Comprehensive Metabolic Panel; Future           Eva Diaz PA-C

## 2025-01-27 NOTE — PROGRESS NOTES
Patient here for follow up visit with Eva Diaz for Dx of dvt  Patient here alone. She ambulated unassisted    Medications and Allergies reviewed and reconciled this visit.    No concerns or complaints noted at this time.   She has red irritated eyes which she says is worse in winter.    She is taking eliquis 5mg bid without incidence.     Follow up per  PA  request.        She had labs drawn prior to appt at [facility].   Pt without My Chart encouraged to call office with any questions  or concerns.      No barriers to education noted, patient agrees to current plan and verbalized understanding using teach back method.

## 2025-01-29 DIAGNOSIS — E78.2 MIXED HYPERLIPIDEMIA: Primary | ICD-10-CM

## 2025-01-29 RX ORDER — ROSUVASTATIN CALCIUM 5 MG/1
5 TABLET, COATED ORAL DAILY
Qty: 100 TABLET | Refills: 3 | Status: SHIPPED | OUTPATIENT
Start: 2025-01-29 | End: 2026-03-05

## 2025-07-24 ENCOUNTER — APPOINTMENT (OUTPATIENT)
Dept: PRIMARY CARE | Facility: CLINIC | Age: 65
End: 2025-07-24
Payer: MEDICAID

## 2025-07-24 VITALS — HEIGHT: 64 IN | OXYGEN SATURATION: 94 % | HEART RATE: 66 BPM | BODY MASS INDEX: 36.02 KG/M2 | WEIGHT: 211 LBS

## 2025-07-24 DIAGNOSIS — E66.01 CLASS 2 SEVERE OBESITY DUE TO EXCESS CALORIES WITH SERIOUS COMORBIDITY AND BODY MASS INDEX (BMI) OF 35.0 TO 35.9 IN ADULT: ICD-10-CM

## 2025-07-24 DIAGNOSIS — E78.2 MIXED HYPERLIPIDEMIA: ICD-10-CM

## 2025-07-24 DIAGNOSIS — R73.03 PREDIABETES: ICD-10-CM

## 2025-07-24 DIAGNOSIS — G62.9 PERIPHERAL POLYNEUROPATHY: ICD-10-CM

## 2025-07-24 DIAGNOSIS — E66.812 CLASS 2 SEVERE OBESITY DUE TO EXCESS CALORIES WITH SERIOUS COMORBIDITY AND BODY MASS INDEX (BMI) OF 35.0 TO 35.9 IN ADULT: ICD-10-CM

## 2025-07-24 DIAGNOSIS — J45.909 ASTHMA, UNSPECIFIED ASTHMA SEVERITY, UNSPECIFIED WHETHER COMPLICATED, UNSPECIFIED WHETHER PERSISTENT (HHS-HCC): ICD-10-CM

## 2025-07-24 DIAGNOSIS — Z12.31 ENCOUNTER FOR SCREENING MAMMOGRAM FOR MALIGNANT NEOPLASM OF BREAST: ICD-10-CM

## 2025-07-24 DIAGNOSIS — R21 RASH: Primary | ICD-10-CM

## 2025-07-24 DIAGNOSIS — K21.9 GASTROESOPHAGEAL REFLUX DISEASE WITHOUT ESOPHAGITIS: ICD-10-CM

## 2025-07-24 DIAGNOSIS — J30.9 ALLERGIC RHINITIS, UNSPECIFIED SEASONALITY, UNSPECIFIED TRIGGER: ICD-10-CM

## 2025-07-24 PROCEDURE — 99396 PREV VISIT EST AGE 40-64: CPT | Performed by: STUDENT IN AN ORGANIZED HEALTH CARE EDUCATION/TRAINING PROGRAM

## 2025-07-24 PROCEDURE — 99214 OFFICE O/P EST MOD 30 MIN: CPT | Performed by: STUDENT IN AN ORGANIZED HEALTH CARE EDUCATION/TRAINING PROGRAM

## 2025-07-24 PROCEDURE — 3008F BODY MASS INDEX DOCD: CPT | Performed by: STUDENT IN AN ORGANIZED HEALTH CARE EDUCATION/TRAINING PROGRAM

## 2025-07-24 PROCEDURE — 99401 PREV MED CNSL INDIV APPRX 15: CPT | Performed by: STUDENT IN AN ORGANIZED HEALTH CARE EDUCATION/TRAINING PROGRAM

## 2025-07-24 RX ORDER — NALTREXONE HYDROCHLORIDE 50 MG/1
TABLET, FILM COATED ORAL
Qty: 30 TABLET | Refills: 11 | Status: SHIPPED | OUTPATIENT
Start: 2025-07-24

## 2025-07-24 RX ORDER — TRIAMCINOLONE ACETONIDE 0.25 MG/G
CREAM TOPICAL 2 TIMES DAILY
Qty: 15 G | Refills: 1 | Status: SHIPPED | OUTPATIENT
Start: 2025-07-24

## 2025-07-24 RX ORDER — FLUTICASONE PROPIONATE 50 MCG
2 SPRAY, SUSPENSION (ML) NASAL DAILY
Qty: 16 G | Refills: 3 | Status: SHIPPED | OUTPATIENT
Start: 2025-07-24

## 2025-07-24 RX ORDER — BUPROPION HYDROCHLORIDE 75 MG/1
75 TABLET ORAL 2 TIMES DAILY
Qty: 60 TABLET | Refills: 5 | Status: SHIPPED | OUTPATIENT
Start: 2025-07-24 | End: 2026-01-20

## 2025-07-24 ASSESSMENT — PATIENT HEALTH QUESTIONNAIRE - PHQ9
5. POOR APPETITE OR OVEREATING: SEVERAL DAYS
6. FEELING BAD ABOUT YOURSELF - OR THAT YOU ARE A FAILURE OR HAVE LET YOURSELF OR YOUR FAMILY DOWN: SEVERAL DAYS
2. FEELING DOWN, DEPRESSED OR HOPELESS: SEVERAL DAYS
8. MOVING OR SPEAKING SO SLOWLY THAT OTHER PEOPLE COULD HAVE NOTICED. OR THE OPPOSITE, BEING SO FIGETY OR RESTLESS THAT YOU HAVE BEEN MOVING AROUND A LOT MORE THAN USUAL: NEARLY EVERY DAY
1. LITTLE INTEREST OR PLEASURE IN DOING THINGS: SEVERAL DAYS
3. TROUBLE FALLING OR STAYING ASLEEP OR SLEEPING TOO MUCH: SEVERAL DAYS
SUM OF ALL RESPONSES TO PHQ9 QUESTIONS 1 AND 2: 2
SUM OF ALL RESPONSES TO PHQ QUESTIONS 1-9: 10
7. TROUBLE CONCENTRATING ON THINGS, SUCH AS READING THE NEWSPAPER OR WATCHING TELEVISION: SEVERAL DAYS
9. THOUGHTS THAT YOU WOULD BE BETTER OFF DEAD, OR OF HURTING YOURSELF: NOT AT ALL
4. FEELING TIRED OR HAVING LITTLE ENERGY: SEVERAL DAYS

## 2025-07-24 NOTE — PROGRESS NOTES
History Of Present Illness  Celia Shahid is a 64 y.o. female presents for cpe.      Neuropathy   Up to her knee bilateral  Worsening     Anxiety   Hopelessness, lack of motivation, increased irritability   Since mother with dementia is living with her  Obtaining caregiver fatigue   Poor sleep         Past Medical History  She has a past medical history of Encounter for screening for malignant neoplasm of cervix (12/11/2013), GERD (gastroesophageal reflux disease), Personal history of other diseases of the nervous system and sense organs, Personal history of other medical treatment, Personal history of pulmonary embolism, Pulmonary emboli, Sleep apnea, Type O blood, Rh positive, and Unspecified hemorrhoids (12/13/2013).    Surgical History  She has a past surgical history that includes Carpal tunnel release (Bilateral, 01/30/2014); Appendectomy (01/30/2014); Foot surgery (Bilateral, 01/30/2014); Esophagogastroduodenoscopy (01/30/2014); and Colonoscopy w/ polypectomy (01/30/2014).     Social History  She reports that she has never smoked. She has never used smokeless tobacco. She reports that she does not currently use alcohol. She reports that she does not currently use drugs.    Family History  Family History[1]     Allergies  Oxaprozin       Physical Exam  Vitals reviewed.   Constitutional:       General: She is not in acute distress.     Appearance: She is not ill-appearing.   HENT:      Right Ear: Tympanic membrane and ear canal normal.      Left Ear: Tympanic membrane and ear canal normal.      Mouth/Throat:      Mouth: Mucous membranes are moist.      Pharynx: Oropharynx is clear. No oropharyngeal exudate or posterior oropharyngeal erythema.     Eyes:      Extraocular Movements: Extraocular movements intact.      Conjunctiva/sclera: Conjunctivae normal.      Pupils: Pupils are equal, round, and reactive to light.     Neck:      Vascular: No carotid bruit.     Cardiovascular:      Rate and Rhythm: Normal rate  and regular rhythm.      Heart sounds: No murmur heard.     No gallop.   Pulmonary:      Effort: Pulmonary effort is normal.      Breath sounds: Normal breath sounds. No wheezing, rhonchi or rales.   Abdominal:      General: Abdomen is flat. Bowel sounds are normal.      Palpations: Abdomen is soft.      Tenderness: There is no abdominal tenderness.     Musculoskeletal:      Cervical back: Neck supple.      Left lower leg: No edema.     Skin:     General: Skin is warm and dry.      Findings: No rash.     Neurological:      General: No focal deficit present.      Mental Status: She is alert and oriented to person, place, and time.      Gait: Gait normal.     Psychiatric:         Mood and Affect: Mood normal.         Behavior: Behavior normal.          Last Recorded Vitals  There were no vitals taken for this visit.    Relevant Results  Current Medications[2]         Assessment/Plan   Diagnoses and all orders for this visit:  Rash  -     triamcinolone (Kenalog) 0.025 % cream; Apply topically 2 times a day. APPLY SPARINGLY TO AFFECTED AREA(S) 2 TO 3 TIMES A DAY on your hands  Allergic rhinitis, unspecified seasonality, unspecified trigger  -     fluticasone (Flonase) 50 mcg/actuation nasal spray; Administer 2 sprays into each nostril once daily.  Peripheral polyneuropathy  -     Vitamin B12; Future  -     EMG & nerve conduction; Future  Prediabetes  -     Hemoglobin A1C; Future  Mixed hyperlipidemia  -     Comprehensive Metabolic Panel; Future  -     TSH with reflex to Free T4 if abnormal; Future  -     CBC and Auto Differential; Future  Gastroesophageal reflux disease without esophagitis  Asthma, unspecified asthma severity, unspecified whether complicated, unspecified whether persistent ([facility])  Encounter for screening mammogram for malignant neoplasm of breast  -     BI mammo bilateral screening tomosynthesis; Future  Class 2 severe obesity due to excess calories with serious comorbidity and body mass index (BMI)  of 35.0 to 35.9 in adult  -     buPROPion (Wellbutrin) 75 mg tablet; Take 1 tablet (75 mg) by mouth 2 times a day.  -     naltrexone (Depade) 50 mg tablet; Take 0.5mg tablet for 2 weeks then increase to 1 full tablet.      Celia Shahid is a 64 year old with a PMHx of DVT/PE post surgery , DVT- 04- ocps, Depression, Asthma (controlled), Psoriasis, DSL who presents for depression follow up.     Prediabetes  A1C 6.2%   Repeat today      Anxiety  controlled   refilled venlafaxine to 150mg daily   Hopelessness, lack of motivation, increased irritability   Since mother with dementia is living with her  Obtaining caregiver fatigue   Poor sleep      Hx of  DVT/PE  HETEROZYGOUS for Factor V Leiden  indefinite anticoagulation  Refilled eliquis 2.5mg BID   pt second provoked DVT with family history of DVT      Obesity   Pt states that she is struggling with motivation to be active.   BMI  35.85 Kg/m2   Pt would like to work on her exercise  Discussed nutritionist and medication supplemental help  - start Wellbutrin and naltrexone for weight loss      Asthma   Intermittent   Controlled      Seasonal allergies   *Flonase continue      Health Maintenance   mammogram UTD 6/23 fu I year  influenza vaccine   Pap 10/6/2023 -/-   Pt has a cologuard      6 month follow up for cpe            Mahnaz Parish DO         [1]   Family History  Problem Relation Name Age of Onset    Heart failure Father     [2]   Current Outpatient Medications:     apixaban (Eliquis) 5 mg tablet, Take 1 tablet (5 mg) by mouth 2 times a day., Disp: 180 tablet, Rfl: 3    estrogens, conjugated, (Premarin) vaginal cream, Use 0.5g daily for two weeks then decrease to 0.5g twice weekly, Disp: 30 g, Rfl: 0    fluticasone (Flonase) 50 mcg/actuation nasal spray, Administer 2 sprays into each nostril once daily., Disp: 16 g, Rfl: 3    nystatin (Mycostatin) cream, Apply topically if needed (rash)., Disp: 30 g, Rfl: 0    omeprazole OTC (PriLOSEC OTC) 20 mg EC tablet, Take  1 tablet (20 mg) by mouth once daily in the morning. Take before meals., Disp: 90 tablet, Rfl: 1    oxybutynin XL (Ditropan-XL) 15 mg 24 hr tablet, Take 1 tablet (15 mg) by mouth once daily. Do not crush, chew, or split., Disp: 90 tablet, Rfl: 1    rosuvastatin (Crestor) 5 mg tablet, Take 1 tablet (5 mg) by mouth once daily., Disp: 100 tablet, Rfl: 3    triamcinolone (Kenalog) 0.025 % cream, APPLY SPARINGLY TO AFFECTED AREA(S) 2 TO 3 TIMES A DAY on your hands, Disp: , Rfl:     venlafaxine XR (Effexor-XR) 150 mg 24 hr capsule, Take 1 capsule (150 mg) by mouth once daily. Do not crush or chew., Disp: 90 capsule, Rfl: 1

## 2025-07-29 LAB
ALBUMIN SERPL-MCNC: 4.3 G/DL (ref 3.6–5.1)
ALP SERPL-CCNC: 88 U/L (ref 37–153)
ALT SERPL-CCNC: 15 U/L (ref 6–29)
ANION GAP SERPL CALCULATED.4IONS-SCNC: 7 MMOL/L (CALC) (ref 7–17)
AST SERPL-CCNC: 18 U/L (ref 10–35)
BASOPHILS # BLD AUTO: 79 CELLS/UL (ref 0–200)
BASOPHILS NFR BLD AUTO: 1.1 %
BILIRUB SERPL-MCNC: 0.4 MG/DL (ref 0.2–1.2)
BUN SERPL-MCNC: 14 MG/DL (ref 7–25)
CALCIUM SERPL-MCNC: 9.1 MG/DL (ref 8.6–10.4)
CHLORIDE SERPL-SCNC: 105 MMOL/L (ref 98–110)
CO2 SERPL-SCNC: 29 MMOL/L (ref 20–32)
CREAT SERPL-MCNC: 0.77 MG/DL (ref 0.5–1.05)
EGFRCR SERPLBLD CKD-EPI 2021: 87 ML/MIN/1.73M2
EOSINOPHIL # BLD AUTO: 396 CELLS/UL (ref 15–500)
EOSINOPHIL NFR BLD AUTO: 5.5 %
ERYTHROCYTE [DISTWIDTH] IN BLOOD BY AUTOMATED COUNT: 13.1 % (ref 11–15)
EST. AVERAGE GLUCOSE BLD GHB EST-MCNC: 131 MG/DL
EST. AVERAGE GLUCOSE BLD GHB EST-SCNC: 7.3 MMOL/L
GLUCOSE SERPL-MCNC: 93 MG/DL (ref 65–99)
HBA1C MFR BLD: 6.2 %
HCT VFR BLD AUTO: 38.5 % (ref 35–45)
HGB BLD-MCNC: 12.6 G/DL (ref 11.7–15.5)
LYMPHOCYTES # BLD AUTO: 2232 CELLS/UL (ref 850–3900)
LYMPHOCYTES NFR BLD AUTO: 31 %
MCH RBC QN AUTO: 29 PG (ref 27–33)
MCHC RBC AUTO-ENTMCNC: 32.7 G/DL (ref 32–36)
MCV RBC AUTO: 88.7 FL (ref 80–100)
MONOCYTES # BLD AUTO: 626 CELLS/UL (ref 200–950)
MONOCYTES NFR BLD AUTO: 8.7 %
NEUTROPHILS # BLD AUTO: 3866 CELLS/UL (ref 1500–7800)
NEUTROPHILS NFR BLD AUTO: 53.7 %
PLATELET # BLD AUTO: 288 THOUSAND/UL (ref 140–400)
PMV BLD REES-ECKER: 9.6 FL (ref 7.5–12.5)
POTASSIUM SERPL-SCNC: 4.2 MMOL/L (ref 3.5–5.3)
PROT SERPL-MCNC: 7 G/DL (ref 6.1–8.1)
RBC # BLD AUTO: 4.34 MILLION/UL (ref 3.8–5.1)
SODIUM SERPL-SCNC: 141 MMOL/L (ref 135–146)
TSH SERPL-ACNC: 2.48 MIU/L (ref 0.4–4.5)
VIT B12 SERPL-MCNC: 407 PG/ML (ref 200–1100)
WBC # BLD AUTO: 7.2 THOUSAND/UL (ref 3.8–10.8)

## 2025-07-30 ENCOUNTER — RESULTS FOLLOW-UP (OUTPATIENT)
Dept: PRIMARY CARE | Facility: CLINIC | Age: 65
End: 2025-07-30
Payer: MEDICAID

## 2025-07-30 NOTE — TELEPHONE ENCOUNTER
----- Message from Mahnaz Parish sent at 7/30/2025 12:41 PM EDT -----  There was no glaring reason for your increase in fatigue. You continue to be within the prediabetic range. Increased sugars can lead to fatigue, increased hunger, thirst and some sleep disturbances.   Please work on reducing stress levels, intake of excess sugars and carbohydrates. It is also recommended to get 150m in of weekly exercise.   Your thyroid, electrolytes, liver, kidney, blood counts, and Vit B12 are within normal limits.   ----- Message -----  From: [facility] Results In  Sent: 7/29/2025  12:00 AM EDT  To: Mahnaz Parish, DO

## 2025-07-31 ENCOUNTER — HOSPITAL ENCOUNTER (OUTPATIENT)
Dept: RADIOLOGY | Facility: HOSPITAL | Age: 65
Discharge: HOME | End: 2025-07-31
Payer: MEDICAID

## 2025-07-31 VITALS — BODY MASS INDEX: 36.02 KG/M2 | WEIGHT: 211 LBS | HEIGHT: 64 IN

## 2025-07-31 DIAGNOSIS — Z12.31 ENCOUNTER FOR SCREENING MAMMOGRAM FOR MALIGNANT NEOPLASM OF BREAST: ICD-10-CM

## 2025-07-31 PROCEDURE — 77067 SCR MAMMO BI INCL CAD: CPT

## 2025-08-15 DIAGNOSIS — F32.A DEPRESSION, UNSPECIFIED DEPRESSION TYPE: ICD-10-CM

## 2025-08-18 RX ORDER — VENLAFAXINE HYDROCHLORIDE 150 MG/1
150 CAPSULE, EXTENDED RELEASE ORAL DAILY
Qty: 90 CAPSULE | Refills: 0 | Status: SHIPPED | OUTPATIENT
Start: 2025-08-18

## 2025-10-27 ENCOUNTER — APPOINTMENT (OUTPATIENT)
Dept: PRIMARY CARE | Facility: CLINIC | Age: 65
End: 2025-10-27
Payer: MEDICAID